# Patient Record
Sex: FEMALE | Race: WHITE | NOT HISPANIC OR LATINO | Employment: OTHER | URBAN - METROPOLITAN AREA
[De-identification: names, ages, dates, MRNs, and addresses within clinical notes are randomized per-mention and may not be internally consistent; named-entity substitution may affect disease eponyms.]

---

## 2017-01-16 ENCOUNTER — APPOINTMENT (OUTPATIENT)
Dept: LAB | Facility: CLINIC | Age: 57
End: 2017-01-16
Payer: COMMERCIAL

## 2017-01-16 ENCOUNTER — ALLSCRIPTS OFFICE VISIT (OUTPATIENT)
Dept: OTHER | Facility: OTHER | Age: 57
End: 2017-01-16

## 2017-01-16 ENCOUNTER — TRANSCRIBE ORDERS (OUTPATIENT)
Dept: LAB | Facility: CLINIC | Age: 57
End: 2017-01-16

## 2017-01-16 DIAGNOSIS — E78.49 OTHER HYPERLIPIDEMIA: ICD-10-CM

## 2017-01-16 DIAGNOSIS — E78.49 OTHER HYPERLIPIDEMIA: Primary | ICD-10-CM

## 2017-01-16 DIAGNOSIS — E78.5 HYPERLIPIDEMIA: ICD-10-CM

## 2017-01-16 LAB
ANION GAP SERPL CALCULATED.3IONS-SCNC: 5 MMOL/L (ref 4–13)
BUN SERPL-MCNC: 11 MG/DL (ref 5–25)
CALCIUM SERPL-MCNC: 9.1 MG/DL (ref 8.3–10.1)
CHLORIDE SERPL-SCNC: 102 MMOL/L (ref 100–108)
CHOLEST SERPL-MCNC: 190 MG/DL (ref 50–200)
CO2 SERPL-SCNC: 30 MMOL/L (ref 21–32)
CREAT SERPL-MCNC: 0.69 MG/DL (ref 0.6–1.3)
GFR SERPL CREATININE-BSD FRML MDRD: >60 ML/MIN/1.73SQ M
GLUCOSE SERPL-MCNC: 91 MG/DL (ref 65–140)
HDLC SERPL-MCNC: 66 MG/DL (ref 40–60)
LDLC SERPL CALC-MCNC: 112 MG/DL (ref 0–100)
POTASSIUM SERPL-SCNC: 4.1 MMOL/L (ref 3.5–5.3)
SODIUM SERPL-SCNC: 137 MMOL/L (ref 136–145)
TRIGL SERPL-MCNC: 61 MG/DL

## 2017-01-16 PROCEDURE — 80048 BASIC METABOLIC PNL TOTAL CA: CPT

## 2017-01-16 PROCEDURE — 80061 LIPID PANEL: CPT

## 2017-01-16 PROCEDURE — 36415 COLL VENOUS BLD VENIPUNCTURE: CPT

## 2017-02-09 ENCOUNTER — ALLSCRIPTS OFFICE VISIT (OUTPATIENT)
Dept: OTHER | Facility: OTHER | Age: 57
End: 2017-02-09

## 2017-06-19 ENCOUNTER — TRANSCRIBE ORDERS (OUTPATIENT)
Dept: ADMINISTRATIVE | Facility: HOSPITAL | Age: 57
End: 2017-06-19

## 2017-06-19 DIAGNOSIS — Z12.31 VISIT FOR SCREENING MAMMOGRAM: Primary | ICD-10-CM

## 2017-07-19 ENCOUNTER — HOSPITAL ENCOUNTER (OUTPATIENT)
Dept: MAMMOGRAPHY | Facility: HOSPITAL | Age: 57
Discharge: HOME | End: 2017-07-19
Payer: COMMERCIAL

## 2017-07-19 DIAGNOSIS — Z12.31 VISIT FOR SCREENING MAMMOGRAM: ICD-10-CM

## 2017-07-19 PROCEDURE — G0202 SCR MAMMO BI INCL CAD: HCPCS

## 2017-10-31 ENCOUNTER — ALLSCRIPTS OFFICE VISIT (OUTPATIENT)
Dept: OTHER | Facility: OTHER | Age: 57
End: 2017-10-31

## 2018-01-09 NOTE — CONSULTS
Chief Complaint  Chief Complaint Free Text Note Form: New pt  here for mwm consult  Pt  stated no complaints  stop bang 1/8      History of Present Illness  Free Text HPI: Obesity-  Severity: Mild  Onset: Past 20 years  Modifiers: Has tried Weight Watchers different diet modifications with success but unsustained  Worse with emotional eating  Associated Symptoms: Feels uncomfortable  Past Medical History    1  History Of 2  Previous Pregnancies (V61 5)   2  History of chest pain (V13 89) (Z87 898)   3  History of Joint pain, knee (719 46) (M25 569)   4  History of Skin rash (782 1) (R21)  Active Problems And Past Medical History Reviewed: The active problems and past medical history were reviewed and updated today  Assessment    1  Weight gain (783 1) (R63 5)   2  Anxiety (300 00) (F41 9)   3  Depression with anxiety (300 4) (F41 8)   4  Adult BMI > 30 (V85 30) (E66 8)    Plan   1  (1) COMPREHENSIVE METABOLIC PANEL; Status:Active; Requested for:21Qau2761;    2  (1) HEMOGLOBIN A1C; Status:Active; Requested for:38Rlp7379;    3  (1) INSULIN, FASTING; Status:Active; Requested for:31Pwm6506;    4  (1) LIPID PANEL, FASTING; Status:Active; Requested for:34Tpe8486;    5  (1) TSH WITH FT4 REFLEX; Status:Active; Requested for:08Mrr7947;    6  Referral to Weight Management Progam Physician Referral  Consult  Status: Active    Requested for: 45TMS0622  Care Summary provided  : Yes    Discussion/Summary  Discussion Summary:   59 yo female w/ anxiety, depression, glaucoma and obesity here to pursue medical weight management to improve weight and health  Obesity class 1:  -discussed options of conservative vs SIMONA program +/- meal replacement  -initial focus of 5-10% weight loss over 3-6 mos for improved health  -screening labs    Depression/anxiety: Stable  -On alprazolam and Zoloft as needed    History of glaucoma  -Cautious with phentermine    RTC for new start visit when labs return   Follow-up 12 weeks Goals: 1  Food log www  [email or web address]  2  No sugary beverages  At least 64oz of water daily  3  Increase physical activity by 10 minutes daily  4  4978-8293 calories, see sample menu  Review of Systems  Focused-Female:   Constitutional: no fever  ENT: no sore throat  Cardiovascular: no chest pain and no palpitations  Respiratory: no shortness of breath  Gastrointestinal: no abdominal pain  Genitourinary: no dysuria  Musculoskeletal: no arthralgias  Integumentary: no rashes  Neurological: no headache  Other Symptoms: Psych:+ Depression/anxiety  Active Problems    1  Adult BMI > 30 (V85 30) (E66 8)   2  Anxiety (300 00) (F41 9)   3  Depression with anxiety (300 4) (F41 8)   4  Glaucoma, narrow-angle (365 20,365 70) (H40 20X0)   5  Need for prophylactic vaccination and inoculation against influenza (V04 81) (Z23)   6  Pap smear for cervical cancer screening (V76 2) (Z12 4)   7  Screening for lipoid disorders (V77 91) (Z13 220)   8  Uterine prolapse (618 1) (N81 4)   9  Visit for screening mammogram (V76 12) (Z12 31)   10  Vitamin D deficiency (268 9) (E55 9)    Surgical History    1  History of Oral Surgery Tooth Extraction  Surgical History Reviewed: The surgical history was reviewed and updated today  Family History  Mother    1  Family history of Chronic Obstructive Pulmonary Disease   2  Family history of Tobacco Use  Father    3  Family history of Bladder Cancer (V16 52)   4  Family history of Diabetes Mellitus (V18 0)   5  Family history of Skin Cancer (V16 8)  Family History Reviewed: The family history was reviewed and updated today  Social History    · Alcohol Use (History)   · Never a smoker  Social History Reviewed: The social history was reviewed and updated today  Current Meds   1  ALPRAZolam 0 25 MG Oral Tablet; TAKE 1 TABLET AT BEDTIME; Therapy: 89COO2275 to (Evaluate:21Mgi1529)  Requested for: 52XPU7250; Last   Rx:79Ymu1997 Ordered   2  Sertraline HCl - 50 MG Oral Tablet; TAKE 1 5 TABLET Daily; Therapy: 48Rbk9051 to (Evaluate:14Hyh0195)  Requested for: 52EEG9047; Last   Rx:05Jan2016 Ordered   3  Vitamin D (Ergocalciferol) 71347 UNIT Oral Capsule; TAKE 1 CAPSULE WEEKLY; Therapy: 95GXK6600 to (Last Rx:17Aug2015)  Requested for: 17Aug2015 Ordered  Medication List Reviewed: The medication list was reviewed and updated today  Allergies    1  No Known Drug Allergies    2  No Known Environmental Allergies   3  No Known Food Allergies    Vitals  Vital Signs [Data Includes: Current Encounter]    Recorded: [id number] 10:57AM   Temperature 98 2 F    Heart Rate 74    Respiration 16    Systolic 354    Diastolic 76    Height 5 ft 4 8 in    Weight 201 lb 8 0 oz    BMI Calculated 33 74    BSA Calculated 1 99    O2 Saturation 98    Waist Circumference  45 5   Neck Circumference  14 5     Physical Exam    Constitutional   General appearance: No acute distress, well appearing and well nourished  well developed and obese  Eyes No conjunctival pallor  Ears, Nose, Mouth, and Throat Moist oral mucosa  Pulmonary   Respiratory effort: No increased work of breathing or signs of respiratory distress  Auscultation of lungs: Clear to auscultation  Cardiovascular   Auscultation of heart: Normal rate and rhythm, normal S1 and S2, without murmurs  Examination of extremities for edema and/or varicosities: Normal     Abdomen   Abdomen: Non-tender, no masses  The abdomen was obese  The abdomen was soft and nontender     Musculoskeletal   Gait and station: Normal     Psychiatric   Orientation to person, place, and time: Normal     Mood and affect: Normal          Results/Data  Encounter Results   STOP BANG Questionnaire [id number] 11:02AM User, Ahs     Test Name Result Flag Reference   STOP BANG Questionnaire Risk of GERMAN Low Risk     Snoring: No  Tired: No  Observed: No  Blood Pressure: No  BMI: No  Age: Yes  Neck Circumference: No  Gender: No   STOP BANG Questionnaire GERMAN Total Score 1     Snoring: No  Tired: No  Observed: No  Blood Pressure: No  BMI: No  Age: Yes  Neck Circumference: No  Gender: No       Future Appointments    Date/Time Provider Specialty Site   08/18/2016 08:00 AM Isela Shi DO [facility] [address] [facility]     Signatures   Electronically signed by : VALERIE Sullivan ; Jul 6 2016  1:04PM EST                       (Author)

## 2018-01-10 NOTE — MISCELLANEOUS
Message  Return to work or school:   Layton Reaves is under my professional care  She was seen in my office on 10/31/2017   She is able to return to work on  11/02/2017      PATIENT WAS SEEN IN OUR OFFICE 10/31/2017 EXCUSE FROM WORK FROM 10/30 TO 11/01 MAY RETURN 11/02/2017  DR Elisa Jesus / Molly Mckinley  Signatures   Electronically signed by :  Thalia Weir, ; Oct 31 2017  4:58PM EST                       (Author)

## 2018-01-12 VITALS
RESPIRATION RATE: 16 BRPM | DIASTOLIC BLOOD PRESSURE: 70 MMHG | BODY MASS INDEX: 32.36 KG/M2 | TEMPERATURE: 97.5 F | HEIGHT: 65 IN | WEIGHT: 194.25 LBS | SYSTOLIC BLOOD PRESSURE: 110 MMHG | HEART RATE: 72 BPM

## 2018-01-12 NOTE — PROGRESS NOTES
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 alcohol  Dietary Recall:   Breakfast: 1 cup cereal, 1/2 c skim milk  Snack: 100 calories or skip  Lunch: 300 calories  Snack: 100 calories  Dinner: 300 calories  Snack: 175   Beverages: water  Estimated fluid intake: >64 oz  Alcohol consumption: gave up  Food allergies/intolerances: n/a  Cooking: self  Shopping: self  She dines out seldom  Exercise Frequency:  She does not exercise  Her obesity/being overweight is related to excess energy intake and as evidenced by BMI=32 7  Nutrition Prescription: Estimated daily protein needs: 68-85 gm (1 2-1 5 gm/kg IBW) and Estimated daily fluid needs: 66 oz (35 cc/kg IBW)  Nutrition Intervention: Counseling provided with emphasis on meal planning, portion sizes, healthy snack choices, hydration, protein intake, exercise and food journaling  Education materials provided: [employer] manual    Comprehension: good  Expected Compliance: good     Goals: follow meal plan prescribed, plan meals and snacks daily, food journal, do not skip meals or snacks and 1200 calories  Monitor/Evaluation: weekly weight, food journal, fluid intake and exercise level  Active Problems    1  Adult BMI > 30 (V85 30) (E66 8)   2  Anxiety (300 00) (F41 9)   3  Depression with anxiety (300 4) (F41 8)   4  Glaucoma, narrow-angle (365 20,365 70) (H40 20X0)   5  Need for prophylactic vaccination and inoculation against influenza (V04 81) (Z23)   6  Pap smear for cervical cancer screening (V76 2) (Z12 4)   7  Screening for lipoid disorders (V77 91) (Z13 220)   8  Uterine prolapse (618 1) (N81 4)   9  Visit for screening mammogram (V76 12) (Z12 31)   10  Vitamin D deficiency (268 9) (E55 9)   11  Weight gain (783 1) (R63 5)    Past Medical History    1  History Of 2  Previous Pregnancies (V61 5)   2  History of chest pain (V13 89) (Z87 898)   3  History of Joint pain, knee (719 46) (M25 569)   4  History of Skin rash (782 1) (R21)    Surgical History    1  History of Oral Surgery Tooth Extraction    Family History  Mother    1  Family history of Chronic Obstructive Pulmonary Disease   2  Family history of Tobacco Use  Father    3  Family history of Bladder Cancer (V16 52)   4  Family history of Diabetes Mellitus (V18 0)   5  Family history of Skin Cancer (V16 8)    Social History    · Alcohol Use (History)   · Never a smoker    Current Meds   1  ALPRAZolam 0 25 MG Oral Tablet; TAKE 1 TABLET AT BEDTIME; Therapy: 86TXT7521 to (Evaluate:96Fwq4151)  Requested for: 43IVY7172; Last   Rx:50Wbc5114 Ordered   2  Sertraline HCl - 50 MG Oral Tablet; TAKE 1 5 TABLET Daily; Therapy: 78Woy0123 to (Evaluate:65Jaw3633)  Requested for: 02JSA4302; Last   Rx:05Jan2016 Ordered   3  Vitamin D (Ergocalciferol) 07046 UNIT Oral Capsule; TAKE 1 CAPSULE WEEKLY; Therapy: 59QER9034 to (Last Rx:17Aug2015)  Requested for: 17Aug2015 Ordered    Allergies    1  No Known Drug Allergies    2  No Known Environmental Allergies   3  No Known Food Allergies    Vitals  Signs   Recorded: 01Fck3598 03:58PM   Height: 5 ft 5 in  Weight: 196 lb 1 6 oz  BMI Calculated: 32 63  BSA Calculated: 1 96    Future Appointments    Date/Time Provider Specialty Site   10/28/2016 03:30 PM VALERIE Fong  Bariatric Medicine [facility]     Signatures   Electronically signed by :  Rosalba Zamora, ; Aug 22 2016  4:28PM EST                       (Author)    Electronically signed by : VALERIE Payne ; Aug 23 2016  9:24AM EST                       (Co-author)

## 2018-01-12 NOTE — PROGRESS NOTES
Chief Complaint  Chief Complaint Free Text Note Form: Patient here for MW follow up, will be back for 5pm class  History of Present Illness  HPI: Doing excellent with lifestyle changes  Very appreciative of the program  Voices no complaints      Past Medical History    1  History Of 2  Previous Pregnancies (V61 5)   2  History of chest pain (V13 89) (Z87 898)   3  History of Joint pain, knee (719 46) (M25 569)   4  History of Skin rash (782 1) (R21)    Assessment    1  Weight gain (783 1) (R63 5)   2  Hyperlipidemia (272 4) (E78 5)   3  Depression with anxiety (300 4) (F41 8)   4  Adult BMI > 30 (V85 30) (E66 8)    Plan  Hyperlipidemia    1  (1) BASIC METABOLIC PROFILE; Status:Active; Requested for:09Nov2016;    2  (1) LIPID PANEL, FASTING; Status:Active; Requested for:09Nov2016;     Discussion/Summary  Discussion Summary:   61 yo female w/ anxiety, depression, glaucoma and obesity here to pursue medical weight management to improve weight and health  Obesity class 1:  -discussed options of conservative vs SIMONA program +/- meal replacement  -initial focus of 5-10% weight loss over 3-6 mos for improved health(met)    Depression/anxiety: Stable  -On alprazolam and Zoloft as needed    History of glaucoma:  -Cautious with phentermine    Hyperlipidemia:  -Recheck lipids    RTC in 3 months with me    Add 2 days per week of resistance band training  Active Problems    1  Adult BMI > 30 (V85 30) (E66 8)   2  Anxiety (300 00) (F41 9)   3  Depression with anxiety (300 4) (F41 8)   4  Glaucoma, narrow-angle (365 20,365 70) (H40 20X0)   5  Need for prophylactic vaccination and inoculation against influenza (V04 81) (Z23)   6  Pap smear for cervical cancer screening (V76 2) (Z12 4)   7  Screening for lipoid disorders (V77 91) (Z13 220)   8  Uterine prolapse (618 1) (N81 4)   9  Visit for screening mammogram (V76 12) (Z12 31)   10  Vitamin D deficiency (268 9) (E55 9)   11  Weight gain (783 1) (R63 5)   12   Well adult on routine health check (V70 0) (Z00 00)    Surgical History    1  History of Oral Surgery Tooth Extraction    Family History  Mother    1  Family history of Chronic Obstructive Pulmonary Disease   2  Family history of Tobacco Use  Father    3  Family history of Bladder Cancer (V16 52)   4  Family history of Diabetes Mellitus (V18 0)   5  Family history of Skin Cancer (V16 8)    Social History    · Alcohol Use (History)   · Never a smoker    Current Meds   1  ALPRAZolam 0 25 MG Oral Tablet; TAKE 1 TABLET AT BEDTIME; Therapy: 87JWA8837 to (Evaluate:03Xvh7429)  Requested for: 23LNL8416; Last   Rx:77Uxz6951 Ordered   2  Sertraline HCl - 50 MG Oral Tablet; TAKE 1 5 TABLET Daily; Therapy: 74Puz7075 to (Evaluate:54Fiy1584)  Requested for: 01KHW2976; Last   Rx:13Mvj9320 Ordered    Allergies    1  No Known Drug Allergies    2  No Known Environmental Allergies   3  No Known Food Allergies    Vitals  Vital Signs    Recorded: 97JIP6773 19:95GM   Systolic 569   Diastolic 70   Heart Rate 76   Respiration 16   Temperature 97 8 F   Height 5 ft 5 in   Weight 185 lb 3 2 oz   BMI Calculated 30 82   BSA Calculated 1 92     Physical Exam    Constitutional   General appearance: No acute distress, well appearing and well nourished  well developed and obese  Eyes No conjunctival pallor  Ears, Nose, Mouth, and Throat Moist oral mucosa  Pulmonary   Respiratory effort: No increased work of breathing or signs of respiratory distress  Auscultation of lungs: Clear to auscultation  Cardiovascular   Auscultation of heart: Normal rate and rhythm, normal S1 and S2, without murmurs  Examination of extremities for edema and/or varicosities: Normal     Abdomen   Abdomen: Non-tender, no masses  The abdomen was obese  The abdomen was soft and nontender     Musculoskeletal   Gait and station: Normal     Psychiatric   Orientation to person, place, and time: Normal     Mood and affect: Normal          Future Appointments    Date/Time Provider Specialty Site   10/16/2017 03:30 PM Rajni Arredondo DO Family Medicine [address] [facility]     Signatures   Electronically signed by : VALERIE Amador ; Nov 9 2016  8:56AM EST                       (Author)

## 2018-01-12 NOTE — PROGRESS NOTES
History of Present Illness  HPI: Mino Schneider is here for f/u  Current wt 188  3 lbs, loss of 15 1 lbs x 6 wks  Tracking consistently and consuming 1200 calories  Indulges more on the w/e but thrilled that she continues to lose  Has not incorporated exercise and we discussed that when she is ready this will help her metabolic rate especially if weight loss plateaus  Bariatric MNT MWM [facility]:   Weight Assessment: IBW: 125, ABW: 140 8, Weight Change: 15 1 lbs x 6 weeks, Highest Weight: 203 4, Lowest Weight: 126, Goal Weight: ,   Weight loss attempts: Weight Watchers: 25 lbs and Other: portion control 20 lbs, LA Wt Loss 45 lbs  Excess calories come from in between meal snacking, large portions at mealtimes and high calorie high fat food choices  Dietary Recall:   Breakfast: multigrain english muffin w/1 TBSP pb, 1 TBSP strawberry jam, vitamins  Snack: skip  Lunch: balanced breaks OR 3 low fat string cheese with cashews  Snack: skip or william bar  Dinner: 4 oz lean pro, 1 1/2 servings veggies, sometimes carbs  Beverages: water  Estimated fluid intake: 64 oz  Alcohol consumption: gin & diet tonic on w/e  Food allergies/intolerances: n/a  Cooking: self  Shopping: self  She dines out occasionally  Exercise Frequency:  She does not exercise  Her obesity/being overweight is related to excess energy intake and as evidenced by BMI=31 4  Nutrition Prescription: Estimated calories for weight loss: eCalc BMR 1435, wt loss w/o exercise: 722-1222, Estimated daily protein needs: 68-85 gm (1 2-1 5 gm/kg IBW) and Estimated daily fluid needs: 66 oz (35 cc/kg (IBW)  Nutrition Intervention: Counseling provided with emphasis on meal planning, portion sizes, healthy snack choices, hydration, fiber intake, exercise and food journaling  Education materials provided: [employer] manual    Comprehension: good  Expected Compliance: good     Goals: follow meal plan prescribed, plan meals and snacks daily, food journal, do not skip meals or snacks and 1200 calories  Monitor/Evaluation: weekly weight, food journal, fluid intake and exercise level  Active Problems    1  Adult BMI > 30 (V85 30) (E66 8)   2  Anxiety (300 00) (F41 9)   3  Depression with anxiety (300 4) (F41 8)   4  Glaucoma, narrow-angle (365 20,365 70) (H40 20X0)   5  Need for prophylactic vaccination and inoculation against influenza (V04 81) (Z23)   6  Pap smear for cervical cancer screening (V76 2) (Z12 4)   7  Screening for lipoid disorders (V77 91) (Z13 220)   8  Uterine prolapse (618 1) (N81 4)   9  Visit for screening mammogram (V76 12) (Z12 31)   10  Vitamin D deficiency (268 9) (E55 9)   11  Weight gain (783 1) (R63 5)    Past Medical History    1  History Of 2  Previous Pregnancies (V61 5)   2  History of chest pain (V13 89) (Z87 898)   3  History of Joint pain, knee (719 46) (M25 569)   4  History of Skin rash (782 1) (R21)    Surgical History    1  History of Oral Surgery Tooth Extraction    Family History  Mother    1  Family history of Chronic Obstructive Pulmonary Disease   2  Family history of Tobacco Use  Father    3  Family history of Bladder Cancer (V16 52)   4  Family history of Diabetes Mellitus (V18 0)   5  Family history of Skin Cancer (V16 8)    Social History    · Alcohol Use (History)   · Never a smoker    Current Meds   1  ALPRAZolam 0 25 MG Oral Tablet; TAKE 1 TABLET AT BEDTIME; Therapy: 25XYS2242 to (Evaluate:82Pps1211)  Requested for: 32XDG7612; Last   Rx:98Ack1202 Ordered   2  Sertraline HCl - 50 MG Oral Tablet; TAKE 1 5 TABLET Daily; Therapy: 54Hco1723 to (Evaluate:60Sym9702)  Requested for: 45GWJ5043; Last   Rx:05Jan2016 Ordered   3  Vitamin D (Ergocalciferol) 56861 UNIT Oral Capsule; TAKE 1 CAPSULE WEEKLY; Therapy: 91KGT5251 to (Last Rx:89Fsq5202)  Requested for: 35Afa5105 Ordered    Allergies    1  No Known Drug Allergies    2  No Known Environmental Allergies   3   No Known Food Allergies    Vitals  Signs   Recorded: 92GJJ2494 04:54PM   Height: 5 ft 5 in  Weight: 188 lb 4 8 oz  BMI Calculated: 31 34  BSA Calculated: 1 93    Future Appointments    Date/Time Provider Specialty Site   11/09/2016 09:00 AM Serge Wren  [address] [facility]   11/09/2016 08:30 AM VALERIE Valencia  Bariatric Medicine [facility]   10/12/2016 03:30 PM Rajwinder Geller DO [facility] [address] [facility]     Signatures   Electronically signed by :  Anita Ramey, ; Sep 26 2016  5:01PM EST                       (Author)    Electronically signed by : VALERIE Mckeon ; Sep 27 2016  8:15AM EST                       (Co-author)

## 2018-01-12 NOTE — PROGRESS NOTES
History of Present Illness  HPI: Patrick Molina here for initial RD visit for [employer]  Current wt: 203 4 lbs  Over last year wt stable, gradual gains prior to this (11/16/2012 186 lbs)  Does better during the school year when she has more structure but states this summer has been doing "whatever" and having a lot of emotional troubles as well as not exercising because she isn't worth it  She states that she needs someone to be held accountable too  Tells me if I tell her to exercise she will  Goal set to go for 2 30min walks and do 30 min on stationary bike over next week  Would prefer to focus on real food vs  products at this time  Does not currently food log but does measure some of her items and has a food background (home )   [facility]ric [facility]:   Weight Assessment: IBW: 125, ABW: 144 6, Highest Weight: 203 4, Lowest Weight: 126 (prior to marriage), Goal Weight: STG <200 lbs,  lbs  Weight loss attempts: Weight Watchers: 25 lbs lbs and Other: portion control lost 20 lbs, LA Wt Loss 45 lbs  Excess calories come from in between meal snacking, large portions at mealtimes, high calorie high fat food choices and ? alcohol  Dietary Recall:   Breakfast: coffee w/splenda, splash of whole milk  1 cup cereal (blend of 4), 1/2 skim milk  Snack: skip  Lunch: Balance Breaks & Yo Plait whipped  Snack: william bar  Dinner: meat/starch (quinoa or rice), veg  Snack: Lot of snacking and alcohol   Beverages: water, coffee, wine, beer  Estimated fluid intake: 64 oz water  Alcohol consumption: beer or wine 4x/night almost daily  During school year only 1x/wk  Food allergies/intolerances: n/a  Cooking: self  Shopping: self  She dines out seldom  Exercise Frequency:  She does not exercise  Her obesity/being overweight is related to excess energy intake and as evidenced by BMI=33 8     Nutrition Prescription: Estimated calories for weight loss: shirley BMR 1569x1 3-3362=9779, eCalc BMR 1503, wt loss w/o exercise 804-1304, w/exercise 8557-0034, Estimated daily protein needs: 68-85 gm (1 2-1 5 gm/kg IBW) and Estimated daily fluid needs: 66 oz (35 cc/kg IBW)  Breakfast: 1 cup cereal, 1/2 cup skim milk (245)  Snack: bar (retail) <200  Lunch: Balance Breaks (180), Yo Plait Whipped (100)  Snack: 100 snack  Dinner: 300 calories: lean pro/starch/veg  Snack: 175 snack   Nutrition Intervention: Counseling provided with emphasis on meal planning, portion sizes, healthy snack choices, hydration, fiber intake, protein intake, exercise and food journaling  Education materials provided: [employer] manual    Comprehension: good  Expected Compliance: good  Goals: follow meal plan prescribed, plan meals and snacks daily, food journal, do not skip meals or snacks and 1200 calories  Monitor/Evaluation: weekly weight, food journal, fluid intake and exercise level  Active Problems    1  Adult BMI > 30 (V85 30) (E66 8)   2  Anxiety (300 00) (F41 9)   3  Depression with anxiety (300 4) (F41 8)   4  Glaucoma, narrow-angle (365 20,365 70) (H40 20X0)   5  Need for prophylactic vaccination and inoculation against influenza (V04 81) (Z23)   6  Pap smear for cervical cancer screening (V76 2) (Z12 4)   7  Screening for lipoid disorders (V77 91) (Z13 220)   8  Uterine prolapse (618 1) (N81 4)   9  Visit for screening mammogram (V76 12) (Z12 31)   10  Vitamin D deficiency (268 9) (E55 9)   11  Weight gain (783 1) (R63 5)    Past Medical History    1  History Of 2  Previous Pregnancies (V61 5)   2  History of chest pain (V13 89) (Z87 898)   3  History of Joint pain, knee (719 46) (M25 569)   4  History of Skin rash (782 1) (R21)    Surgical History    1  History of Oral Surgery Tooth Extraction    Family History  Mother    1  Family history of Chronic Obstructive Pulmonary Disease   2  Family history of Tobacco Use  Father    3  Family history of Bladder Cancer (V16 52)   4   Family history of Diabetes Mellitus (V18 0)   5  Family history of Skin Cancer (V16 8)    Social History    · Alcohol Use (History)   · Never a smoker    Current Meds   1  ALPRAZolam 0 25 MG Oral Tablet; TAKE 1 TABLET AT BEDTIME; Therapy: 98LDF7111 to (Evaluate:56Ttl2046)  Requested for: 54KLM7968; Last   Rx:97Pzx5351 Ordered   2  Sertraline HCl - 50 MG Oral Tablet; TAKE 1 5 TABLET Daily; Therapy: 71Fqz5307 to (Evaluate:78Wcj2867)  Requested for: 10TAB3691; Last   Rx:05Jan2016 Ordered   3  Vitamin D (Ergocalciferol) 86863 UNIT Oral Capsule; TAKE 1 CAPSULE WEEKLY; Therapy: 59KLO4697 to (Last Rx:17Aug2015)  Requested for: 17Aug2015 Ordered    Allergies    1  No Known Drug Allergies    2  No Known Environmental Allergies   3  No Known Food Allergies    Vitals  Signs   Recorded: 42Itf5936 03:04PM   Height: 5 ft 5 in  Weight: 203 lb 6 oz  BMI Calculated: 33 84  BSA Calculated: 1 99    Results/Data  Tanita Flowsheet 05WCV9920 03:22PM [facility]     Test Name Result Flag Reference   Height 65     Weight 203 4     Body Fat % 44 7     Fat Mass 91 0     FFM ( Fat Free Mass) 112 4     Muscle Mass 106 8     TBW ( Total Body Water) 76 2     TBW % 37 5     Bone Mass 5 6     BMR ( Basal Metabolic Rate) 4843     Metabolic Age 66     Visceral Fat Rating 12     BMI 33 8         Future Appointments    Date/Time Provider Specialty Site   08/22/2016 03:30 PM Susan Cormier [address] [place] [address] [facility]   10/28/2016 03:30 PM VALERIE Mack  Bariatric Medicine [facility]   08/18/2016 08:00 AM Annamaria Jones DO Family Medicine [address] [facility]     Signatures   Electronically signed by :  Zachary Nj, ; Aug  9 2016  3:19PM EST                       (Author)    Electronically signed by : VALERIE Noland ; Aug  9 2016  4:04PM EST                       (Co-author)

## 2018-01-13 NOTE — PROGRESS NOTES
History of Present Illness  HPI: Stephanie Chapin is here for f/u  Current wt 185 8 lbs, loss of 0 5 lb x 2 wks  Scale this a m  at home is 181 8 lbs and prior to appointment she did drink >1 L water and had breakfast after her labwork  Slight discouragement but states she is under a lot of stress with making all the costumes for school play so routines have been disrupted  She is improving her water intake and has decreased alcohol intake which were issues at last visit  Her evening schedule now has her at work until 10:00 p m  and is having difficulty fitting in a "dinner " Suggestions given for this  [facility]:   Weight Assessment: IBW: 125 lbs, ABW: 140 2 lbs, Weight Change: loss of 17 6 lbs x 4 months, Highest Weight: 203 4 lbs, Lowest Weight: 126 lbs, Goal Weight:  lbs,  lbs  Excess calories come from in between meal snacking, large portions at mealtimes and high calorie high fat food choices  Dietary Recall:   Breakfast: high protein oatmeal or powedered pb w/jelly on english muffin  Snack: varies  Lunch: vanilla greek yogurt w/granola  Snack: trail mix  Dinner: skip  Snack: trail mix   Beverages: water, coffee, gin/tonic, wine  Food allergies/intolerances: n/a  Cooking: self  Shopping: self  She dines out 1-2 times per week  Exercise Frequency:  She does not exercise  Her obesity/being overweight is related to excess energy intake and as evidenced by BMI=31 0  Nutrition Prescription: Estimated calories for weight loss: eCalc BMR 1424, wt loss w/o exercise 709-1209, Estimated daily protein needs: 68-85 gm (1 2-1 5 gm/kg IBW) and Estimated daily fluid needs: 66 oz (35 cc/kg IBW)  Dinner: s/w: s/w thin or 2 slices light bread, 3 oz lunch meat, 1 slice cheese, veggies)  Nutrition Intervention: Counseling provided with emphasis on meal planning, portion sizes, hydration, protein intake and food journaling     Education materials provided: [employer] manual    Comprehension: good  Expected Compliance: good  Goals: follow meal plan prescribed, reduce portion sizes at mealtimes, plan meals and snacks daily, food journal, do not skip meals or snacks and 1200 calories  Monitor/Evaluation: weekly weight, food journal, fluid intake and exercise level  Active Problems    1  Adult BMI > 30 (V85 30) (E66 8)   2  Anxiety (300 00) (F41 9)   3  Depression with anxiety (300 4) (F41 8)   4  Glaucoma, narrow-angle (365 20,365 70) (H40 20X0)   5  Hyperlipidemia (272 4) (E78 5)   6  Need for prophylactic vaccination and inoculation against influenza (V04 81) (Z23)   7  Pap smear for cervical cancer screening (V76 2) (Z12 4)   8  Screening for lipoid disorders (V77 91) (Z13 220)   9  Uterine prolapse (618 1) (N81 4)   10  Visit for screening mammogram (V76 12) (Z12 31)   11  Vitamin D deficiency (268 9) (E55 9)   12  Weight gain (783 1) (R63 5)   13  Well adult on routine health check (V70 0) (Z00 00)    Past Medical History    1  History Of 2  Previous Pregnancies (V61 5)   2  History of chest pain (V13 89) (Z87 898)   3  History of Joint pain, knee (719 46) (M25 569)   4  History of Skin rash (782 1) (R21)    Surgical History    1  History of Oral Surgery Tooth Extraction    Family History  Mother    1  Family history of Chronic Obstructive Pulmonary Disease   2  Family history of Tobacco Use  Father    3  Family history of Bladder Cancer (V16 52)   4  Family history of Diabetes Mellitus (V18 0)   5  Family history of Skin Cancer (V16 8)    Social History    · Alcohol Use (History)   · Never a smoker    Current Meds   1  ALPRAZolam 0 25 MG Oral Tablet; TAKE 1 TABLET AT BEDTIME; Therapy: 87TEK9307 to (Evaluate:43Pwt0369)  Requested for: 06OJG1715; Last   Rx:15Cfv5107 Ordered   2  Sertraline HCl - 50 MG Oral Tablet; TAKE 1 5 TABLET Daily; Therapy: 31Pvh9473 to (Evaluate:90Bbl7974)  Requested for: 67BZU8284; Last   Rx:05Jan2016 Ordered    Allergies    1   No Known Drug Allergies    2  No Known Environmental Allergies   3  No Known Food Allergies    Vitals  Signs   Recorded: 76VRQ8848 08:52AM   Height: 5 ft 5 in  Weight: 185 lb 12 8 oz  BMI Calculated: 30 92  BSA Calculated: 1 92    Future Appointments    Date/Time Provider Specialty Site   02/09/2017 03:30 PM VALERIE Reynoso  Bariatric Medicine [facility]   10/16/2017 03:30 PM Bernadette Marina DO [facility] [address] [facility]     Signatures   Electronically signed by :  Nathaniel Waddell, ; Jan 17 2017  9:08AM EST                       (Author)    Electronically signed by : VALERIE Ma ; Jan 17 2017  9:33AM EST                       (Co-author)

## 2018-01-13 NOTE — PROGRESS NOTES
Assessment    1  Well adult on routine health check (V70 0) (Z00 00)   2  Need for prophylactic vaccination and inoculation against influenza (V04 81) (Z23)    Plan  Depression with anxiety    · Sertraline HCl - 50 MG Oral Tablet; TAKE 1 5 TABLET Daily  Need for prophylactic vaccination and inoculation against influenza    · Fluzone Quadrivalent 0 5 ML Intramuscular Suspension Prefilled Syringe    Discussion/Summary  health maintenance visit Currently, she eats a healthy diet and has an adequate exercise regimen  cervical cancer screening is current Breast cancer screening: mammogram is current  Colorectal cancer screening: colorectal cancer screening is current  The immunizations are needed  Advice and education were given regarding aerobic exercise and vitamin D supplements  Patient discussion: discussed with the patient  Chief Complaint  Patient here for Annual wellness exam      History of Present Illness  HM, Adult Female: The patient is being seen for a health maintenance evaluation  General Health: The patient's health since the last visit is described as good  She has regular dental visits  She denies vision problems  She denies hearing loss  Immunizations status: not up to date  Lifestyle:  She consumes a diverse and healthy diet  She does not have any weight concerns  She exercises regularly  She does not use tobacco  She denies alcohol use  She denies drug use  Reproductive health: the patient is postmenopausal    Screening: cancer screening reviewed and updated  Cervical cancer screening includes a pap smear performed last year  Breast cancer screening includes a mammogram performed last year  Colorectal cancer screening includes a colonoscopy performed within the past ten years  metabolic screening reviewed and updated  Metabolic screening includes lipid profile performed within the past five years, glucose screening performed last year and thyroid function test performed last year  HPI: FELL AT [facility] 64  IN AUGUST HAD DENTAL PAIN- SINUS INFECTION, TEETH PAIN, WENT TO ENT  INFECTED LYMPH NODE,      Review of Systems    Constitutional: No fever, no chills, feels well, no tiredness, no recent weight gain or weight loss  Eyes: No complaints of eye pain, no red eyes, no eyesight problems, no discharge, no dry eyes, no itching of eyes  ENT: no complaints of earache, no loss of hearing, no nose bleeds, no nasal discharge, no sore throat, no hoarseness  Cardiovascular: No complaints of slow heart rate, no fast heart rate, no chest pain, no palpitations, no leg claudication, no lower extremity edema  Respiratory: No complaints of shortness of breath, no wheezing, no cough, no SOB on exertion, no orthopnea, no PND  Gastrointestinal: No complaints of abdominal pain, no constipation, no nausea or vomiting, no diarrhea, no bloody stools  Genitourinary: No complaints of dysuria, no incontinence, no pelvic pain, no dysmenorrhea, no vaginal discharge or bleeding  Musculoskeletal: THUMB PAIN, but as noted in HPI  Integumentary: No complaints of skin rash or lesions, no itching, no skin wounds, no breast pain or lump  Neurological: No complaints of headache, no confusion, no convulsions, no numbness, no dizziness or fainting, no tingling, no limb weakness, no difficulty walking  Psychiatric: Not suicidal, no sleep disturbance, no anxiety or depression, no change in personality, no emotional problems  Endocrine: No complaints of proptosis, no hot flashes, no muscle weakness, no deepening of the voice, no feelings of weakness  Hematologic/Lymphatic: No complaints of swollen glands, no swollen glands in the neck, does not bleed easily, does not bruise easily  Active Problems    1  Adult BMI > 30 (V85 30) (E66 8)   2  Anxiety (300 00) (F41 9)   3  Depression with anxiety (300 4) (F41 8)   4  Glaucoma, narrow-angle (365 20,365 70) (H40 20X0)   5   Need for prophylactic vaccination and inoculation against influenza (V04 81) (Z23)   6  Pap smear for cervical cancer screening (V76 2) (Z12 4)   7  Screening for lipoid disorders (V77 91) (Z13 220)   8  Uterine prolapse (618 1) (N81 4)   9  Visit for screening mammogram (V76 12) (Z12 31)   10  Vitamin D deficiency (268 9) (E55 9)   11  Weight gain (783 1) (R63 5)    Past Medical History    · History Of 2  Previous Pregnancies (V61 5)   · History of chest pain (V13 89) (Z87 898)   · History of Joint pain, knee (719 46) (M25 569)   · History of Skin rash (782 1) (R21)    Surgical History    · History of Oral Surgery Tooth Extraction    Family History  Mother    · Family history of Chronic Obstructive Pulmonary Disease   · Family history of Tobacco Use  Father    · Family history of Bladder Cancer (V16 52)   · Family history of Diabetes Mellitus (V18 0)   · Family history of Skin Cancer (V16 8)    Social History    · Alcohol Use (History)   · Socially   · Never a smoker    Current Meds   1  ALPRAZolam 0 25 MG Oral Tablet; TAKE 1 TABLET AT BEDTIME; Therapy: 76TTV5844 to (Evaluate:02Qrx9464)  Requested for: 80YLE1365; Last   Rx:83Tyj5309 Ordered   2  Sertraline HCl - 50 MG Oral Tablet; TAKE 1 5 TABLET Daily; Therapy: 23Fyz7775 to (Evaluate:01Mpv7337)  Requested for: 22TYQ2169; Last   Rx:03Ejw2647 Ordered    Allergies    1  No Known Drug Allergies    2  No Known Environmental Allergies   3  No Known Food Allergies    Vitals   Recorded: 65FFL4072 84:56UC   Systolic 211   Diastolic 64   Heart Rate 68   Respiration 18   Height 5 ft 4 49 in   Weight 188 lb    BMI Calculated 31 78   BSA Calculated 1 92     Physical Exam    Constitutional   General appearance: No acute distress, well appearing and well nourished  Head and Face   Head and face: Normal     Eyes   Conjunctiva and lids: No swelling, erythema or discharge  Pupils and irises: Equal, round, reactive to light      Ears, Nose, Mouth, and Throat   External inspection of ears and nose: Normal     Otoscopic examination: Tympanic membranes translucent with normal light reflex  Canals patent without erythema  Hearing: Normal     Nasal mucosa, septum, and turbinates: Normal without edema or erythema  Lips, teeth, and gums: Normal, good dentition  Oropharynx: Normal with no erythema, edema, exudate or lesions  Neck   Neck: Supple, symmetric, trachea midline, no masses  Thyroid: Normal, no thyromegaly  Pulmonary   Respiratory effort: No increased work of breathing or signs of respiratory distress  Auscultation of lungs: Clear to auscultation  Cardiovascular   Auscultation of heart: Normal rate and rhythm, normal S1 and S2, no murmurs  Examination of extremities for edema and/or varicosities: Normal     Abdomen   Abdomen: Non-tender, no masses  Liver and spleen: No hepatomegaly or splenomegaly  Lymphatic   Palpation of lymph nodes in neck: No lymphadenopathy  Palpation of lymph nodes in axillae: No lymphadenopathy  Musculoskeletal   Gait and station: Normal     Digits and nails: Normal without clubbing or cyanosis  Joints, bones, and muscles: Normal     Range of motion: Normal     Stability: Normal     Muscle strength/tone: Normal     Skin   Skin and subcutaneous tissue: Normal without rashes or lesions  Palpation of skin and subcutaneous tissue: Normal turgor  Neurologic   Cranial nerves: Cranial nerves II-XII intact  Cortical function: Normal mental status  Reflexes: 2+ and symmetric  Sensation: No sensory loss  Coordination: Normal finger to nose and heel to shin  Psychiatric   Judgment and insight: Normal     Orientation to person, place, and time: Normal     Recent and remote memory: Intact  Mood and affect: Normal        Future Appointments    Date/Time Provider Specialty Site   11/09/2016 09:00 AM Chuck Bauer  [facility]   11/09/2016 08:30 AM VALERIE Pearson   Bariatric Medicine [facility]   10/16/2017 03:30 PM Cherise Soares DO Family Medicine [address] [facility]     Signatures   Electronically signed by : Karie Herzog DO; Oct 12 2016  4:53PM EST                       (Author)

## 2018-01-14 VITALS — BODY MASS INDEX: 30.96 KG/M2 | WEIGHT: 185.8 LBS | HEIGHT: 65 IN

## 2018-01-14 NOTE — PROGRESS NOTES
History of Present Illness  HPI: Melina Veronica is here for f/u  Current wt 185 2 lbs, stable x 30 days with overall loss of 18 2 lbs x 4 months  She has not been tracking as she was but does track often on paper including reasons why she is eating  Holidays are stressful for her and her children have not been home much, doesn't do as well when she is lonely  She has been drinking alcohol more than she had been but is using diet tonic and found a lower calorie gin  Praised her maintenance over the holiday and explained that it is ok not to be in weight loss mode all the time  Maintenance through the holidays is a great goal  She has increased her water intake which was a goal of hers  [facility]ric [facility]:   Weight Assessment: IBW: 125 lbs, ABW: 140 1 lbs, Weight Change: 18 2 lbs x 4m 1 wk, Highest Weight: 203 4 lbs, Lowest Weight: 126 lbs, Goal Weight:  lbs,  lbs  Weight loss attempts: Weight Watchers: 25 lbs and Other: portion control 20 lbs, LA Wt Loss 45 lbs  Excess calories come from in between meal snacking, large portions at mealtimes and high calorie high fat food choices  Dietary Recall:   Breakfast: high protein oatmeal or pb on english muffin  Snack: [place] high protein bar  Lunch: balanced breaks or yogurt w/1/4 cup yogurt  Snack: popcorn  trail mix  Dinner: lazaro Martin  Snack: usually skip or trail mix   Beverages: water, coffee  Estimated fluid intake: 64 oz  Food allergies/intolerances: n/a  Cooking: self  Shopping: self  She dines out occasionally  Exercise Frequency:  She does not exercise  Her obesity/being overweight is related to excess energy intake and as evidenced by BMI=30 9     Nutrition Prescription: Estimated calories for weight loss: REEvue 1685, wt loss w/o exercise 1190, Tanita 1469x1 3-3256=612-3181, Estimated daily protein needs: 68-85 gm (1 2-1 5 gm/kg IBW) and Estimated daily fluid needs: 66 oz (35 cc/kg IBW)        Nutrition Intervention: Counseling provided with emphasis on meal planning, portion sizes, healthy snack choices, hydration, fiber intake, protein intake and food journaling  Education materials provided: [employer]irection manual    Comprehension: good  Expected Compliance: good  Goals: follow meal plan prescribed, reduce portion sizes at mealtimes, plan meals and snacks daily, Choose lower-calorie, lower-fat meal options at home and when dining out, food journal, do not skip meals or snacks and 1200 calories  Monitor/Evaluation: weekly weight, food journal, fluid intake and exercise level  Active Problems    1  Adult BMI > 30 (V85 30) (E66 8)   2  Anxiety (300 00) (F41 9)   3  Depression with anxiety (300 4) (F41 8)   4  Glaucoma, narrow-angle (365 20,365 70) (H40 20X0)   5  Hyperlipidemia (272 4) (E78 5)   6  Need for prophylactic vaccination and inoculation against influenza (V04 81) (Z23)   7  Pap smear for cervical cancer screening (V76 2) (Z12 4)   8  Screening for lipoid disorders (V77 91) (Z13 220)   9  Uterine prolapse (618 1) (N81 4)   10  Visit for screening mammogram (V76 12) (Z12 31)   11  Vitamin D deficiency (268 9) (E55 9)   12  Weight gain (783 1) (R63 5)   13  Well adult on routine health check (V70 0) (Z00 00)    Past Medical History    1  History Of 2  Previous Pregnancies (V61 5)   2  History of chest pain (V13 89) (Z87 898)   3  History of Joint pain, knee (719 46) (M25 569)   4  History of Skin rash (782 1) (R21)    Surgical History    1  History of Oral Surgery Tooth Extraction    Family History  Mother    1  Family history of Chronic Obstructive Pulmonary Disease   2  Family history of Tobacco Use  Father    3  Family history of Bladder Cancer (V16 52)   4  Family history of Diabetes Mellitus (V18 0)   5  Family history of Skin Cancer (V16 8)    Social History    · Alcohol Use (History)   · Never a smoker    Current Meds   1   ALPRAZolam 0 25 MG Oral Tablet; TAKE 1 TABLET AT BEDTIME; Therapy: 23DEY7287 to (Evaluate:32Jpo1364)  Requested for: 41MUJ4622; Last   Rx:44Xjr3325 Ordered   2  Sertraline HCl - 50 MG Oral Tablet; TAKE 1 5 TABLET Daily; Therapy: 57Bux0120 to (Evaluate:13Wnc9473)  Requested for: 60ZDW6038; Last   Rx:95Yqy8622 Ordered    Allergies    1  No Known Drug Allergies    2  No Known Environmental Allergies   3  No Known Food Allergies    Vitals  Signs   Recorded: 21Ltd7997 03:43PM   Height: 5 ft 5 in  Weight: 185 lb 3 2 oz  BMI Calculated: 30 82  BSA Calculated: 1 91    Future Appointments    Date/Time Provider Specialty Site   12/29/2016 09:00 AM Crystal Long  [facility]   01/12/2017 03:30 PM Susan Cormier [facility]   02/09/2017 03:30 PM VALERIE Calle  Bariatric Medicine [facility]   10/16/2017 03:30 PM Presley Figueredo DO Family Medicine [address] [facility]     Signatures   Electronically signed by :  Francheska Bojorquez, ; Dec 15 2016  3:50PM EST                       (Author)    Electronically signed by : VALERIE Romano ; Dec 16 2016  3:55PM EST                       (Co-author)

## 2018-01-16 NOTE — PROGRESS NOTES
History of Present Illness  HPI: Tremaine Koroma is here for f/u  Current wt 186 3 lbs, gain of 0 9 lb x 1 month and overall loss of 17 1 lbs x 5 months  She is having a tough time with the holidays, spending a lot of time alone and struggling with depression  When asked what she can do for herself that may help her mood she states drinking alcohol would be her crutch  However she is watching the amount and is looking forward to going back to her routine once school resumes  She finds it difficult to be at home so much with people stopping by and having food so accessible  She hasn't been tracking over the last week or so but is trying to be aware of her choices  Water intake has also declined with an increase of coffee and she is working on this  Her goal for next visit in ~2 wks is a 3 3 lb weight loss (183 lbs)  Bariatric [facility]:   Weight Assessment: IBW: 125 lbs, ABW: 140 3 lbs, Weight Change: 17 1 lb loss x 5 m, Highest Weight: 203 4 lbs, Lowest Weight: 126 lbs, Goal Weight:  lbs,  lbs  Excess calories come from in between meal snacking, large portions at mealtimes, high calorie high fat food choices and alcohol  Dietary Recall:   Breakfast: high protein oatmeal or pb on english muffin  Snack: varies  Lunch: dennis savi delightful bread (2 slices =69 federica), meatloaf  Snack: trail mix  Dinner: Yonathan Primes, veggies  Snack: trail mix   Beverages: water, coffee, gin&tonic  Alcohol consumption: >prior  Food allergies/intolerances: n/a  Cooking: self  Shopping: self  She dines out 1-2 times per week  Exercise Frequency:  She does not exercise  Her obesity/being overweight is related to excess energy intake and as evidenced by BMI=31 0  Nutrition Prescription: Estimated calories for weight loss: eCalc BMR 1426, wt loss w/o exercise 711-1211, Estimated daily protein needs: 68-85 gm (1 2-1 5 gm/kg IBW) and Estimated daily fluid needs: 66 oz (35 cc/kg IBW)  Nutrition Intervention: Counseling provided with emphasis on hydration, food journaling and stress management  Education materials provided: [employer] Direction manual    Comprehension: good  Expected Compliance: good  Goals: follow meal plan prescribed, plan meals and snacks daily, Choose lower-calorie, lower-fat meal options at home and when dining out, food journal, do not skip meals or snacks, increase fluid intake 64 oz and 1200 calories  Monitor/Evaluation: weekly weight, food journal, fluid intake and exercise level  Active Problems    1  Adult BMI > 30 (V85 30) (E66 8)   2  Anxiety (300 00) (F41 9)   3  Depression with anxiety (300 4) (F41 8)   4  Glaucoma, narrow-angle (365 20,365 70) (H40 20X0)   5  Hyperlipidemia (272 4) (E78 5)   6  Need for prophylactic vaccination and inoculation against influenza (V04 81) (Z23)   7  Pap smear for cervical cancer screening (V76 2) (Z12 4)   8  Screening for lipoid disorders (V77 91) (Z13 220)   9  Uterine prolapse (618 1) (N81 4)   10  Visit for screening mammogram (V76 12) (Z12 31)   11  Vitamin D deficiency (268 9) (E55 9)   12  Weight gain (783 1) (R63 5)   13  Well adult on routine health check (V70 0) (Z00 00)    Past Medical History    1  History Of 2  Previous Pregnancies (V61 5)   2  History of chest pain (V13 89) (Z87 898)   3  History of Joint pain, knee (719 46) (M25 569)   4  History of Skin rash (782 1) (R21)    Surgical History    1  History of Oral Surgery Tooth Extraction    Family History  Mother    1  Family history of Chronic Obstructive Pulmonary Disease   2  Family history of Tobacco Use  Father    3  Family history of Bladder Cancer (V16 52)   4  Family history of Diabetes Mellitus (V18 0)   5  Family history of Skin Cancer (V16 8)    Social History    · Alcohol Use (History)   · Never a smoker    Current Meds   1  ALPRAZolam 0 25 MG Oral Tablet; TAKE 1 TABLET AT BEDTIME;    Therapy: 07WRQ4339 to (Evaluate:50Hmh4598)  Requested for: 31HVQ6175; Last   Rx:44Zac4242 Ordered   2  Sertraline HCl - 50 MG Oral Tablet; TAKE 1 5 TABLET Daily; Therapy: 83Jim9296 to (Evaluate:48Rwu3441)  Requested for: 97RAI9541; Last   Rx:05Jan2016 Ordered    Allergies    1  No Known Drug Allergies    2  No Known Environmental Allergies   3  No Known Food Allergies    Vitals  Signs   Recorded: 53Jvn2898 10:41AM   Height: 5 ft 5 in  Weight: 186 lb 4 8 oz  BMI Calculated: 31  BSA Calculated: 1 92    Future Appointments    Date/Time Provider Specialty Site   01/12/2017 03:30 PM Susan Cormier [address] [facility]   02/09/2017 03:30 PM VALERIE Garcia  Bariatric Medicine [facility]   10/16/2017 03:30 PM Ryan Stubbs DO Family Medicine [facility]     Signatures   Electronically signed by :  Jose Knowles, ; Dec 29 2016 10:54AM EST                       (Author)    Electronically signed by : VALERIE Spears ; Paddy  3 2017  1:06PM EST                       (Co-author)

## 2018-01-16 NOTE — PROGRESS NOTES
Chief Complaint  Chief Complaint Free Text Note Form: Patient here for [facility] follow up; patient reports she just completed a 10 day regimen of Abx for a root canal       History of Present Illness  HPI: Doing excellent with lifestyle changes  Busy with school play and making costumes so has not incorporated regular exercise as of yet      Past Medical History    1  History Of 2  Previous Pregnancies (V61 5)   2  History of chest pain (V13 89) (Z87 898)   3  History of Joint pain, knee (719 46) (M25 569)   4  History of Skin rash (782 1) (R21)    Assessment    1  Weight gain (783 1) (R63 5)   2  Depression with anxiety (300 4) (F41 8)   3  Hyperlipidemia (272 4) (E78 5)   4  Adult BMI > 30 (V85 30) (E66 8)    Discussion/Summary  Discussion Summary:   59 yo female w/ anxiety, depression, glaucoma and obesity here to pursue medical weight management to improve weight and health  Initial: 201 8  Current: 182  4  Change: -19 4lb (-10%)    Obesity class 1:  -Enrolled in [facility] program   -initial focus of 5-10% weight loss over 3-6 mos for improved health (met)  -screening labs-completed and within acceptable limits    Depression/anxiety: Stable  -On alprazolam as needed and Zoloft    History of glaucoma  -Cautious with phentermine    HPL: improved  -Tchol 228-->190, -->112 (1/2017)    RTC in 3 mos w/ me     Patient's Capacity to Self-Care: Patient is able to Self-Care  Understands and agrees with treatment plan: The treatment plan was reviewed with the patient/guardian  The patient/guardian understands and agrees with the treatment plan      Active Problems    1  Adult BMI > 30 (V85 30) (E66 8)   2  Anxiety (300 00) (F41 9)   3  Depression with anxiety (300 4) (F41 8)   4  Glaucoma, narrow-angle (365 20,365 70) (H40 20X0)   5  Hyperlipidemia (272 4) (E78 5)   6  Need for prophylactic vaccination and inoculation against influenza (V04 81) (Z23)   7  Pap smear for cervical cancer screening (V76 2) (Z12 4)   8  Screening for lipoid disorders (V77 91) (Z13 220)   9  Uterine prolapse (618 1) (N81 4)   10  Visit for screening mammogram (V76 12) (Z12 31)   11  Vitamin D deficiency (268 9) (E55 9)   12  Weight gain (783 1) (R63 5)   13  Well adult on routine health check (V70 0) (Z00 00)    Surgical History    1  History of Oral Surgery Tooth Extraction    Family History  Mother    1  Family history of Chronic Obstructive Pulmonary Disease   2  Family history of Tobacco Use  Father    3  Family history of Bladder Cancer (V16 52)   4  Family history of Diabetes Mellitus (V18 0)   5  Family history of Skin Cancer (V16 8)    Social History    · Alcohol Use (History)   · Never a smoker    Current Meds   1  ALPRAZolam 0 25 MG Oral Tablet; TAKE 1 TABLET AT BEDTIME; Therapy: 35RKK3063 to (Evaluate:23Vfk8924)  Requested for: 63JGY7378; Last   Rx:54Umt8523 Ordered   2  Sertraline HCl - 50 MG Oral Tablet; TAKE 1 5 TABLET Daily; Therapy: 87Xwt6993 to (Evaluate:05Zlt8201)  Requested for: 16EMF2086; Last   Rx:60Fgs0193 Ordered    Allergies    1  No Known Drug Allergies    2  No Known Environmental Allergies   3  No Known Food Allergies    Vitals  Vital Signs    Recorded: 82BES5814 03:03PM   Temperature 97 5 F   Heart Rate 72   Respiration 15   Systolic 624   Diastolic 66   Height 5 ft 5 in   Weight 182 lb 6 4 oz   BMI Calculated 30 35   BSA Calculated 1 9     Physical Exam    Constitutional   General appearance: No acute distress, well appearing and well nourished  well developed and obese  Eyes No conjunctival pallor  Ears, Nose, Mouth, and Throat Moist oral mucosa  Pulmonary   Respiratory effort: No increased work of breathing or signs of respiratory distress  Auscultation of lungs: Clear to auscultation  Cardiovascular   Auscultation of heart: Normal rate and rhythm, normal S1 and S2, without murmurs  Examination of extremities for edema and/or varicosities: Normal     Abdomen   Abdomen: Non-tender, no masses    The abdomen was obese  The abdomen was soft and nontender     Musculoskeletal   Gait and station: Normal     Psychiatric   Orientation to person, place, and time: Normal     Mood and affect: Normal          Future Appointments    Date/Time Provider Specialty Site   10/16/2017 03:30 PM Nisreencindi Temi, [address] [place] [address]     Signatures   Electronically signed by : VALERIE Tsang ; Feb 9 2017  3:25PM EST                       (Author)

## 2018-01-16 NOTE — PROGRESS NOTES
History of Present Illness  HPI: Abimael Polanco is here for f/u  Current wt 185 4 lbs, stable x 3 wks  Notes that prior to Thanksgiving weight was down ~2 more pounds  Overall loss of 18 lbs x 4 months (8 8%)  Has improved water intake since last visit  She continues to track and is generally consuming ~1200 calories  No physical activity incorporated but is parking further away from places  She would like weight to be 179 by the next visit in 2 weeks, this is a larger loss than she generally has and considering lack of physical inactivity may be an unrealistic goal for her  Discussed having back up goals so that she is not let down  Goal A: 179 lbs, Goal B: 182 lbs and Goal C: any loss or a nonscale goal such as kept up with water or was diligent with snacks  [facility]:   Weight Assessment: IBW: 125 lbs, ABW: 140 1 lbs, Weight Change: 18 lb loss x 4 months, Highest Weight: 203 4 lbs, Lowest Weight: 126 lbs, Goal Weight:  lbs,  lbs  Weight loss attempts: Weight Watchers: 25 lbs lbs and Other: portion control 20 lbs, LA Wt Loss 45 lbs  Excess calories come from in between meal snacking, large portions at mealtimes and high calorie high fat food choices  Dietary Recall:   Breakfast: high protein oatmeal    Snack: [place] high protein bar  Lunch: balanced breaks OR yogurt w/1/4 cup granola  Snack: popcorn  Dinner: Yonathan Primes, veggies  Snack: usually skip   Beverages: water, coffee  Estimated fluid intake: >48 oz water  Alcohol consumption: occasional    Food allergies/intolerances: n/a  Cooking: self  Shopping: self  She dines out occasionally  Exercise Frequency:  She does not exercise  Her obesity/being overweight is related to excess energy intake and as evidenced by BMI=30 9  Nutrition Prescription: Estimated calories for weight loss: REEvue 1685, wt loss w/o exercise 1190;  Tanita BMR 1469, wt loss w/o exercise 702-1202, Estimated daily protein needs: 68-85 gm (1 2-1 5 gm/kg IBW) and Estimated daily fluid needs: 66 oz (35 cc/kg IBW)  Nutrition Intervention: Counseling provided with emphasis on meal planning, portion sizes, healthy snack choices, hydration, protein intake and food journaling  Education materials provided: [employer]irection manual    Comprehension: good  Expected Compliance: good  Goals: follow meal plan prescribed, plan meals and snacks daily, Choose lower-calorie, lower-fat meal options at home and when dining out, food journal, do not skip meals or snacks and 1200 calories  Monitor/Evaluation: weekly weight, food journal, fluid intake and exercise level  Active Problems    1  Adult BMI > 30 (V85 30) (E66 8)   2  Anxiety (300 00) (F41 9)   3  Depression with anxiety (300 4) (F41 8)   4  Glaucoma, narrow-angle (365 20,365 70) (H40 20X0)   5  Hyperlipidemia (272 4) (E78 5)   6  Need for prophylactic vaccination and inoculation against influenza (V04 81) (Z23)   7  Pap smear for cervical cancer screening (V76 2) (Z12 4)   8  Screening for lipoid disorders (V77 91) (Z13 220)   9  Uterine prolapse (618 1) (N81 4)   10  Visit for screening mammogram (V76 12) (Z12 31)   11  Vitamin D deficiency (268 9) (E55 9)   12  Weight gain (783 1) (R63 5)   13  Well adult on routine health check (V70 0) (Z00 00)    Past Medical History    1  History Of 2  Previous Pregnancies (V61 5)   2  History of chest pain (V13 89) (Z87 898)   3  History of Joint pain, knee (719 46) (M25 569)   4  History of Skin rash (782 1) (R21)    Surgical History    1  History of Oral Surgery Tooth Extraction    Family History  Mother    1  Family history of Chronic Obstructive Pulmonary Disease   2  Family history of Tobacco Use  Father    3  Family history of Bladder Cancer (V16 52)   4  Family history of Diabetes Mellitus (V18 0)   5  Family history of Skin Cancer (V16 8)    Social History    · Alcohol Use (History)   · Never a smoker    Current Meds   1  ALPRAZolam 0 25 MG Oral Tablet; TAKE 1 TABLET AT BEDTIME; Therapy: 70NSX9454 to (Evaluate:84Uwk1921)  Requested for: 25ZAK9692; Last   Rx:58Ozp9400 Ordered   2  Sertraline HCl - 50 MG Oral Tablet; TAKE 1 5 TABLET Daily; Therapy: 93Eqz6718 to (Evaluate:77Stb3696)  Requested for: 77HMF0565; Last   Rx:10Qhf2070 Ordered    Allergies    1  No Known Drug Allergies    2  No Known Environmental Allergies   3  No Known Food Allergies    Vitals  Signs   Recorded: 52JBA6924 03:56PM   Height: 5 ft 5 in  Weight: 185 lb 6 4 oz  BMI Calculated: 30 85  BSA Calculated: 1 92    Future Appointments    Date/Time Provider Specialty Site   12/15/2016 03:30 PM Susan Cormier [facility]   02/09/2017 03:30 PM VALERIE Urban  Bariatric Medicine [facility]   10/16/2017 03:30 PM Donavan Daly DO Family [facility] [address] [facility]     Signatures   Electronically signed by :  Kp Terry, ; Dec  1 2016  4:16PM EST                       (Author)    Electronically signed by : VALERIE Ojeda ; Dec 12 2016 11:06AM EST                       (Co-author)

## 2018-01-18 NOTE — RESULT NOTES
[employer]   Labs okay for new direction program  Schedule a new start visit  (12 week follow-up)     Verified Results  (1) COMPREHENSIVE METABOLIC PANEL 06NNX6970 20:90OJ Canelo Smith, [address] [place] [address] Order Number: [id number]  TW Order Number: [id number] Order Number: [id number] Order Number: [id number]     Test Name Result Flag Reference   GLUCOSE,RANDM 94 mg/dL     If the patient is fasting, the ADA then defines impaired fasting glucose as > 100 mg/dL and diabetes as > or equal to 123 mg/dL  SODIUM 137 mmol/L  136-145   POTASSIUM 3 9 mmol/L  3 5-5 3   CHLORIDE 101 mmol/L  100-108   CARBON DIOXIDE 30 mmol/L  21-32   ANION GAP (CALC) 6 mmol/L  4-13   BLOOD UREA NITROGEN 10 mg/dL  5-25   CREATININE 0 86 mg/dL  0 60-1 30   Standardized to IDMS reference method   CALCIUM 9 4 mg/dL  8 3-10 1   BILI, TOTAL 0 70 mg/dL  0 20-1 00   ALK PHOSPHATAS 63 U/L     ALT (SGPT) 32 U/L  12-78   AST(SGOT) 20 U/L  5-45   ALBUMIN 3 5 g/dL  3 5-5 0   TOTAL PROTEIN 7 9 g/dL  6 4-8 2   eGFR Non-African American      >60 0 ml/min/1 [address] [facility] Disease Education Program recommendations are as follows:  GFR calculation is accurate only with a steady state creatinine  Chronic Kidney disease less than 60 ml/min/1 73 sq  meters  Kidney failure less than 15 ml/min/1 73 sq  meters  (1) HEMOGLOBIN A1C 13Qre7125 09:21AM Alvena Sessions   TW Order Number: [id number]  TW Order Number: [id number]     Test Name Result Flag Reference   HEMOGLOBIN A1C 5 2 %  4 2-6 3   EST  AVG   GLUCOSE 103 mg/dl       (1) LIPID PANEL, FASTING 72MGV5817 09:21AM Alvena Sessions   TW Order Number: [id number]  TW Order Number: [id number] Order Number: [id number] Order Number: [id number]     Test Name Result Flag Reference   CHOLESTEROL 228 mg/dL H    HDL,DIRECT 72 mg/dL H 40-60   Specimen collection should occur prior to Metamizole administration due to the potential for falsely depressed results  LDL CHOLESTEROL CALCULATED 144 mg/dL H 0-100   Triglyceride:         Normal              <150 mg/dl       Borderline High    150-199 mg/dl       High               200-499 mg/dl       Very High          >499 mg/dl  Cholesterol:         Desirable        <200 mg/dl      Borderline High  200-239 mg/dl      High             >239 mg/dl  HDL Cholesterol:        High    >59 mg/dL      Low     <41 mg/dL  LDL CALCULATED:    This screening LDL is a calculated result  It does not have the accuracy of the Direct Measured LDL in the monitoring of patients with hyperlipidemia and/or statin therapy  Direct Measure LDL (LBX446) must be ordered separately in these patients  TRIGLYCERIDES 60 mg/dL  <=150   Specimen collection should occur prior to N-Acetylcysteine or Metamizole administration due to the potential for falsely depressed results  (1) TSH WITH FT4 REFLEX 07Jul2016 09:21AM [place] [facility] Order Number: [id number]  TW Order Number: [id number] Order Number: [id number] Order Number: [id number]     Test Name Result Flag Reference   TSH 2 474 uIU/mL  0 358-3 740   Patients undergoing fluorescein dye angiography may retain small amounts of fluorescein in the body for 48-72 hours post procedure  Samples containing fluorescein can produce falsely depressed TSH values  If the patient had this procedure,a specimen should be resubmitted post fluorescein clearance            The recommended reference ranges for TSH during pregnancy are as follows:  First trimester 0 1 to 2 5 uIU/mL  Second trimester  0 2 to 3 0 uIU/mL  Third trimester 0 3 to 3 0 uIU/m     (1) INSULIN, FASTING 07Jul2016 09:21AM [place] [facility] Order Number: [id number]  TW Order Number: [id number]     Test Name Result Flag Reference   INSULIN, FASTING 12 7 mU/L  3 0-25 0

## 2018-01-22 VITALS
TEMPERATURE: 97.5 F | RESPIRATION RATE: 15 BRPM | HEART RATE: 72 BPM | BODY MASS INDEX: 30.39 KG/M2 | WEIGHT: 182.4 LBS | SYSTOLIC BLOOD PRESSURE: 112 MMHG | DIASTOLIC BLOOD PRESSURE: 66 MMHG | HEIGHT: 65 IN

## 2018-02-12 DIAGNOSIS — F41.9 ANXIETY: Primary | ICD-10-CM

## 2018-02-12 DIAGNOSIS — F32.A DEPRESSION, UNSPECIFIED DEPRESSION TYPE: ICD-10-CM

## 2018-02-12 RX ORDER — ALPRAZOLAM 0.25 MG/1
1 TABLET ORAL
COMMUNITY
Start: 2012-02-19 | End: 2018-02-12 | Stop reason: SDUPTHER

## 2018-02-12 RX ORDER — ALPRAZOLAM 0.25 MG/1
TABLET ORAL
Qty: 30 TABLET | Refills: 0 | OUTPATIENT
Start: 2018-02-12 | End: 2019-04-22 | Stop reason: ALTCHOICE

## 2018-03-20 ENCOUNTER — OFFICE VISIT (OUTPATIENT)
Dept: FAMILY MEDICINE CLINIC | Facility: CLINIC | Age: 58
End: 2018-03-20
Payer: COMMERCIAL

## 2018-03-20 VITALS
HEART RATE: 72 BPM | BODY MASS INDEX: 33.49 KG/M2 | HEIGHT: 65 IN | DIASTOLIC BLOOD PRESSURE: 80 MMHG | RESPIRATION RATE: 18 BRPM | SYSTOLIC BLOOD PRESSURE: 126 MMHG | WEIGHT: 201 LBS

## 2018-03-20 DIAGNOSIS — Z00.00 WELL ADULT EXAM: Primary | ICD-10-CM

## 2018-03-20 DIAGNOSIS — E78.5 HYPERLIPIDEMIA, UNSPECIFIED HYPERLIPIDEMIA TYPE: ICD-10-CM

## 2018-03-20 DIAGNOSIS — E55.9 VITAMIN D DEFICIENCY: ICD-10-CM

## 2018-03-20 PROCEDURE — 99396 PREV VISIT EST AGE 40-64: CPT | Performed by: FAMILY MEDICINE

## 2018-03-20 NOTE — PROGRESS NOTES
Assessment/Plan:         Diagnoses and all orders for this visit:    Well adult exam  Comments:  up to date with health maintanence    Hyperlipidemia, unspecified hyperlipidemia type  -     CBC; Future  -     Comprehensive metabolic panel; Future  -     Lipid Panel with Direct LDL reflex; Future    Vitamin D deficiency  -     TSH, 3rd generation with T4 reflex; Future  -     Vitamin D 25 hydroxy; Future          Subjective:      Patient ID: Fredy Mendoza is a 59 y o  female  Well Adult Physical   Patient here for a comprehensive physical exam The patient reports no problems    Do you take any herbs or supplements that were not prescribed by a doctor? no   Are you taking calcium supplements? no   Are you taking aspirin daily? no     History:  LMP: No LMP recorded  Patient is postmenopausal   Menopause at 52 years  Last pap date:   Abnormal pap? no  : 3  Para: 2  , term  colonscopy up to date  Labs needed for this year          The following portions of the patient's history were reviewed and updated as appropriate: allergies, current medications, past family history, past medical history, past social history, past surgical history and problem list     Review of Systems   Constitutional: Negative  HENT: Negative  Eyes: Negative  Respiratory: Negative  Cardiovascular: Negative  Gastrointestinal: Negative  Endocrine: Negative  Genitourinary: Negative  Musculoskeletal: Negative  Skin: Negative  Allergic/Immunologic: Negative  Neurological: Negative  Hematological: Negative  Psychiatric/Behavioral: Negative  Objective:      /80 (BP Location: Right arm, Patient Position: Sitting, Cuff Size: Standard)   Pulse 72   Resp 18   Ht 5' 5" (1 651 m)   Wt 91 2 kg (201 lb)   BMI 33 45 kg/m²          Physical Exam   Constitutional: She is oriented to person, place, and time  Vital signs are normal  She appears well-developed and well-nourished     HENT: Head: Normocephalic and atraumatic  Nose: Nose normal    Mouth/Throat: Oropharynx is clear and moist    Eyes: Conjunctivae, EOM and lids are normal  Pupils are equal, round, and reactive to light  Neck: Normal range of motion  Neck supple  Cardiovascular: Normal rate, regular rhythm, S1 normal, S2 normal, normal heart sounds and intact distal pulses  Pulmonary/Chest: Effort normal and breath sounds normal    Abdominal: Soft  Bowel sounds are normal    Musculoskeletal: Normal range of motion  Neurological: She is alert and oriented to person, place, and time  She has normal strength and normal reflexes  Skin: Skin is warm, dry and intact  Psychiatric: She has a normal mood and affect  Her speech is normal and behavior is normal  Judgment and thought content normal  Cognition and memory are normal    Nursing note and vitals reviewed

## 2018-03-20 NOTE — PATIENT INSTRUCTIONS

## 2018-08-08 DIAGNOSIS — F32.A DEPRESSION, UNSPECIFIED DEPRESSION TYPE: ICD-10-CM

## 2018-09-20 ENCOUNTER — OFFICE VISIT (OUTPATIENT)
Dept: FAMILY MEDICINE CLINIC | Facility: CLINIC | Age: 58
End: 2018-09-20
Payer: COMMERCIAL

## 2018-09-20 VITALS
TEMPERATURE: 97.7 F | RESPIRATION RATE: 14 BRPM | WEIGHT: 204.4 LBS | HEART RATE: 64 BPM | DIASTOLIC BLOOD PRESSURE: 74 MMHG | OXYGEN SATURATION: 94 % | BODY MASS INDEX: 34.89 KG/M2 | SYSTOLIC BLOOD PRESSURE: 120 MMHG | HEIGHT: 64 IN

## 2018-09-20 DIAGNOSIS — E55.9 VITAMIN D DEFICIENCY: ICD-10-CM

## 2018-09-20 DIAGNOSIS — E78.5 HYPERLIPIDEMIA, UNSPECIFIED HYPERLIPIDEMIA TYPE: ICD-10-CM

## 2018-09-20 DIAGNOSIS — Z23 NEED FOR INFLUENZA VACCINATION: Primary | ICD-10-CM

## 2018-09-20 DIAGNOSIS — E66.9 OBESITY, CLASS I, BMI 30-34.9: ICD-10-CM

## 2018-09-20 PROCEDURE — 90471 IMMUNIZATION ADMIN: CPT

## 2018-09-20 PROCEDURE — 1036F TOBACCO NON-USER: CPT | Performed by: FAMILY MEDICINE

## 2018-09-20 PROCEDURE — 3008F BODY MASS INDEX DOCD: CPT | Performed by: FAMILY MEDICINE

## 2018-09-20 PROCEDURE — 90682 RIV4 VACC RECOMBINANT DNA IM: CPT

## 2018-09-20 PROCEDURE — 99214 OFFICE O/P EST MOD 30 MIN: CPT | Performed by: FAMILY MEDICINE

## 2018-09-20 NOTE — PROGRESS NOTES
Assessment/Plan:    Problem List Items Addressed This Visit     Hyperlipidemia     Check levels         Vitamin D deficiency     Check levels         Obesity, Class I, BMI 30-34 9     Need to change diet  And increase exercise           Other Visit Diagnoses     Need for influenza vaccination    -  Primary    Relevant Orders    influenza vaccine, [id number], quadrivalent, recombinant, PF, 0 5 mL, for patients 18 yr+ ([facility])          Patient Instructions   Hyperlipidemia   AMBULATORY CARE:   Hyperlipidemia  is a high level of lipids (fats) in your blood  These lipids include cholesterol or triglycerides  Lipids are made by your body  They also come from the foods you eat  Your body needs lipids to work properly, but high levels increase your risk for heart disease, heart attack, and stroke  Call 911 for any of the following:   · You have any of the following signs of a heart attack:      ¨ Squeezing, pressure, or pain in your chest that lasts longer than 5 minutes or returns    ¨ Discomfort or pain in your back, neck, jaw, stomach, or arm     ¨ Trouble breathing    ¨ Nausea or vomiting    ¨ Lightheadedness or a sudden cold sweat, especially with chest pain or trouble breathing    · You have any of the following signs of a stroke:      ¨ Numbness or drooping on one side of your face     ¨ Weakness in an arm or leg    ¨ Confusion or difficulty speaking    ¨ Dizziness, a severe headache, or vision loss  Contact your healthcare provider if:   · You have questions or concerns about your condition or care  Treatment of hyperlipidemia  may first include lifestyle changes to help decrease your lipid levels  You may also need to take medicine to lower your lipid levels  Some of the lifestyle changes you may need to make include the following:  · Maintain a healthy weight  Ask your healthcare provider how much you should weigh  Ask him or her to help you create a weight loss plan if you are overweight   Weight loss can decrease your cholesterol and triglyceride levels  · Exercise as directed  Exercise lowers your cholesterol levels and helps you maintain a healthy weight  Get 40 minutes or more of moderate exercise 3 to 4 days each week  You can split your exercise into four 10-minute workouts instead of 40 minutes at one time  Examples of moderate exercises include walking briskly, swimming, or riding a bike  Work with your healthcare provider to plan the best exercise program for you  · Do not smoke  Nicotine and other chemicals in cigarettes and cigars can increase your risk for a heart attack and stroke  Ask your healthcare provider for information if you currently smoke and need help to quit  E-cigarettes or smokeless tobacco still contain nicotine  Talk to your healthcare provider before you use these products  · Eat heart-healthy foods  Talk to your dietitian about a heart-healthy diet  The following will help you manage hyperlipidemia:     ¨ Decrease the total amount of fat you eat  Choose lean meats, fat-free or 1% fat milk, and low-fat dairy products, such as yogurt and cheese  Limit or do not eat red meat  Red meats are high in fat and cholesterol  ¨ Replace unhealthy fats with healthy fats  Unhealthy fats include saturated fat, trans fat, and cholesterol  Choose soft margarines that are low in saturated fat and have little or no trans fat  Monounsaturated fats are healthy fats  These are found in olive oil, canola oil, avocado, and nuts  Polyunsaturated fats are also healthy  These are found in fish, flaxseed, walnuts, and soybeans  ¨ Eat fruits and vegetables every day  They are low in calories and fat and a good source of essential vitamins  Include dark green, red, and orange vegetables  Examples include spinach, kale, broccoli, and carrots  ¨ Eat foods high in fiber  Choose whole grain, high-fiber foods   Good choices include whole-wheat breads or cereals, beans, peas, fruits, and vegetables  · Ask your healthcare provider if it is safe for you to drink alcohol  Alcohol can increase your cholesterol and triglyceride levels  A drink of alcohol is 12 ounces of beer, 5 ounces of wine, or 1½ ounces of liquor  Follow up with your healthcare provider as directed: You may need to return for more tests  Your healthcare provider may refer you to a dietitian  Write down your questions so you remember to ask them during your visits  © 2017 [employer]  Information is for End User's use only and may not be sold, redistributed or otherwise used for commercial purposes  All illustrations and images included in CareNotes® are the copyrighted property of A D [employer] M [employer] Inc  or [address]  The above information is an  only  It is not intended as medical advice for individual conditions or treatments  Talk to your doctor, nurse or pharmacist before following any medical regimen to see if it is safe and effective for you  No Follow-up on file  Subjective:      Patient ID: Inocencia Valdez is a 59 y o  female  Chief Complaint   Patient presents with    Follow-up     Patient here for 6 month follow up on Anxiety, Depression       Here for follow up  Doing well  Concerned about weight        Hyperlipidemia   This is a chronic problem  The current episode started more than 1 year ago  The problem is controlled  Recent lipid tests were reviewed and are normal  There are no known factors aggravating her hyperlipidemia  She is currently on no antihyperlipidemic treatment  The current treatment provides significant improvement of lipids  There are no compliance problems  The following portions of the patient's history were reviewed and updated as appropriate: allergies, current medications, past family history, past medical history, past social history, past surgical history and problem list     Review of Systems   Constitutional: Negative      HENT: Negative  Eyes: Negative  Respiratory: Negative  Cardiovascular: Negative  Gastrointestinal: Negative  Endocrine: Negative  Genitourinary: Negative  Musculoskeletal: Negative  Skin: Negative  Allergic/Immunologic: Negative  Neurological: Negative  Hematological: Negative  Psychiatric/Behavioral: Negative  Current Outpatient Prescriptions   Medication Sig Dispense Refill    ALPRAZolam (XANAX) 0 25 mg tablet Take 1 tablet by mouth 30 tablet 0    sertraline (ZOLOFT) 50 mg tablet TAKE 1 5 TABLETS BY MOUTH DAILY 135 tablet 1     No current facility-administered medications for this visit  Objective:    /74   Pulse 64   Temp 97 7 °F (36 5 °C)   Resp 14   Ht 5' 4 49" (1 638 m)   Wt 92 7 kg (204 lb 6 4 oz)   SpO2 94%   BMI 34 56 kg/m²        Physical Exam   Constitutional: She appears well-developed and well-nourished  HENT:   Head: Normocephalic  Eyes: Pupils are equal, round, and reactive to light  Neck: Normal range of motion  Neck supple  Cardiovascular: Normal rate, regular rhythm, normal heart sounds and intact distal pulses  Pulmonary/Chest: Effort normal and breath sounds normal    Abdominal: Soft  Bowel sounds are normal    Musculoskeletal: Normal range of motion  Neurological: She is alert  Skin: Skin is warm and dry  Psychiatric: She has a normal mood and affect  Her behavior is normal  Judgment and thought content normal    Nursing note and vitals reviewed               Casie Roque DO

## 2018-09-20 NOTE — PATIENT INSTRUCTIONS

## 2019-01-01 NOTE — PROGRESS NOTES
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 to incorporate exercise due to class schedule  Encouraged to do so on the weekends and then aim for 1 school day  She also often skips one of her daily snacks due to time constraints (most often in the a m )  Hydration has declined and this was evident with Tanita  She is drinking about 1/2 of what she was last time  Is not able to leave class to use bathroom which has caused her to drink less  She has a plan moving forward to increase this  Bariatric [facility]NT [facility]:   Weight Assessment: IBW: 125 lbs, ABW: 139 9 lbs, Weight Change: 18 8 lb loss x 3 m, Highest Weight: 203 4 lbs, Lowest Weight: 126 lbs, Goal Weight:  lbs,  lbs  Weight loss attempts: Weight Watchers: 25 lbs and Other: portion control 20 lbs, LA Wt Loss 45 lbs  Excess calories come from in between meal snacking, large portions at mealtimes and high calorie high fat food choices  Dietary Recall:   Breakfast: multigrain english muffin w/2 TBSP PB2, 1 TBSP strawberry jam    Snack: skip  Lunch: balanced breaks OR yogurt w/granola  Snack: oat/chocolate bar  Dinner: 4 oz lean pro, veggies, 1 carb  Snack: SF chocolate pudding   Beverages: water, coffee  Estimated fluid intake: 32 oz water  Alcohol consumption: w/e  Food allergies/intolerances: n/a  Cooking: self  Shopping: self  She dines out occasionally  Exercise Frequency:  She does not exercise  Her obesity/being overweight is related to excess energy intake and as evidenced by BMI=30 7     Nutrition Prescription: Estimated calories for weight loss: REEvue DCH=0096, wt loss w/o exercise 1190, w/exercise 1366; Tanita BMR 1469, wt loss w/o exercise 702-1202, wt loss w/exercise 950-1450, Estimated daily protein needs: 68-85 gm (1 2-1 5 gm/kg IBW) and Estimated daily fluid needs: 66 oz (35 cc/kg IBW)  Nutrition Intervention: Counseling provided with emphasis on meal planning, portion sizes, healthy snack choices, hydration, protein intake, exercise and food journaling  Education materials provided: [employer] manual    Comprehension: good  Expected Compliance: good  Goals: follow meal plan prescribed, plan meals and snacks daily, food journal, do not skip meals or snacks, increase fluid intake 66 oz and 1200 calories nonexercise, 1350 exercise   Monitor/Evaluation: weekly weight, food journal, fluid intake and exercise level  Active Problems    1  Adult BMI > 30 (V85 30) (E66 8)   2  Anxiety (300 00) (F41 9)   3  Depression with anxiety (300 4) (F41 8)   4  Glaucoma, narrow-angle (365 20,365 70) (H40 20X0)   5  Hyperlipidemia (272 4) (E78 5)   6  Need for prophylactic vaccination and inoculation against influenza (V04 81) (Z23)   7  Pap smear for cervical cancer screening (V76 2) (Z12 4)   8  Screening for lipoid disorders (V77 91) (Z13 220)   9  Uterine prolapse (618 1) (N81 4)   10  Visit for screening mammogram (V76 12) (Z12 31)   11  Vitamin D deficiency (268 9) (E55 9)   12  Weight gain (783 1) (R63 5)   13  Well adult on routine health check (V70 0) (Z00 00)    Past Medical History    1  History Of 2  Previous Pregnancies (V61 5)   2  History of chest pain (V13 89) (Z87 898)   3  History of Joint pain, knee (719 46) (M25 569)   4  History of Skin rash (782 1) (R21)    Surgical History    1  History of Oral Surgery Tooth Extraction    Family History  Mother    1  Family history of Chronic Obstructive Pulmonary Disease   2  Family history of Tobacco Use  Father    3  Family history of Bladder Cancer (V16 52)   4   Family history of Diabetes Mellitus (V18 0)   5  Family history of Skin Cancer (V16 8)    Social History    · Alcohol Use (History)   · Never a smoker    Current Meds   1  ALPRAZolam 0 25 MG Oral Tablet; TAKE 1 TABLET AT BEDTIME; Therapy: 72ICG9289 to (Evaluate:64Dli9542)  Requested for: 46JMW0244; Last   Rx:[id number] Ordered   2  Sertraline HCl - 50 MG Oral Tablet; TAKE 1 5 TABLET Daily; Therapy: 64Ags7750 to (Evaluate:77Ahh6962)  Requested for: 07IVQ1823; Last   Rx:[id number] Ordered    Allergies    1  No Known Drug Allergies    2  No Known Environmental Allergies   3  No Known Food Allergies    Vitals  Signs   Recorded: 81DMJ4312 09:15AM   Height: 5 ft 5 in  Weight: 184 lb 9 6 oz  BMI Calculated: 30 72  BSA Calculated: 1 91  Recorded: 20JUH0116 09:14AM   Waist Circumference: 40 25    Results/Data  Metabolic Analyzer - POC [id number] 10:51AM [employer]     Test Name Result Flag Reference   Metabolic Analyzer 8624       Summary / No summary entered :      No summary entered  Documents attached :      Deshawn Bolton: [id number] - Appointment - Boothchar[facility] Prado -      (Bariatric Medicine) (Additional Information Document)  Tanita Flowsheet [id number] 09:13AM [employer]     Test Name Result Flag Reference   Height 65     Weight 184 6     Body Fat % 42 7     Fat Mass 78 8     FFM ( Fat Free Mass) 105 8     Muscle Mass 100 4     TBW ( Total Body Water) 70 0     TBW % 37 9     Bone Mass 5 4     BMR ( Basal Metabolic Rate) 4845     Metabolic Age 66     Visceral Fat Rating 11     BMI 30 7         Future Appointments    Date/Time Provider Specialty Site   12/01/2016 03:30 PM [facility]  [facility]   12/15/2016 03:30 PM Susan Cormier [facility]   02/09/2017 03:30 PM VALERIE Lucas  Bariatric Medicine [facility]   10/16/2017 03:30 PM Edi Blevins DO Family Medicine [address] [facility]     Signatures   Electronically signed by :  Aaliyah Kristin Hawkins, ; Nov 9 2016 10:46AM EST                       (Author)    Electronically signed by : VALERIE Tsang ; Nov 9 2016  4:50PM EST                       (Co-author) yes

## 2019-01-02 ENCOUNTER — TRANSCRIBE ORDERS (OUTPATIENT)
Dept: ADMINISTRATIVE | Facility: HOSPITAL | Age: 59
End: 2019-01-02

## 2019-01-02 DIAGNOSIS — Z12.39 BREAST SCREENING: Primary | ICD-10-CM

## 2019-01-05 ENCOUNTER — APPOINTMENT (OUTPATIENT)
Dept: LAB | Facility: CLINIC | Age: 59
End: 2019-01-05
Payer: COMMERCIAL

## 2019-01-05 DIAGNOSIS — E78.5 HYPERLIPIDEMIA, UNSPECIFIED HYPERLIPIDEMIA TYPE: ICD-10-CM

## 2019-01-05 DIAGNOSIS — E55.9 VITAMIN D DEFICIENCY: ICD-10-CM

## 2019-01-05 LAB
25(OH)D3 SERPL-MCNC: 29.5 NG/ML (ref 30–100)
ALBUMIN SERPL BCP-MCNC: 3.7 G/DL (ref 3.5–5)
ALP SERPL-CCNC: 71 U/L (ref 46–116)
ALT SERPL W P-5'-P-CCNC: 36 U/L (ref 12–78)
ANION GAP SERPL CALCULATED.3IONS-SCNC: 10 MMOL/L (ref 4–13)
AST SERPL W P-5'-P-CCNC: 18 U/L (ref 5–45)
BILIRUB SERPL-MCNC: 0.8 MG/DL (ref 0.2–1)
BUN SERPL-MCNC: 12 MG/DL (ref 5–25)
CALCIUM SERPL-MCNC: 9.4 MG/DL (ref 8.3–10.1)
CHLORIDE SERPL-SCNC: 101 MMOL/L (ref 100–108)
CHOLEST SERPL-MCNC: 230 MG/DL (ref 50–200)
CO2 SERPL-SCNC: 29 MMOL/L (ref 21–32)
CREAT SERPL-MCNC: 0.86 MG/DL (ref 0.6–1.3)
ERYTHROCYTE [DISTWIDTH] IN BLOOD BY AUTOMATED COUNT: 12.9 % (ref 11.6–15.1)
GFR SERPL CREATININE-BSD FRML MDRD: 74 ML/MIN/1.73SQ M
GLUCOSE P FAST SERPL-MCNC: 95 MG/DL (ref 65–99)
HCT VFR BLD AUTO: 45.4 % (ref 34.8–46.1)
HDLC SERPL-MCNC: 67 MG/DL (ref 40–60)
HGB BLD-MCNC: 14.5 G/DL (ref 11.5–15.4)
LDLC SERPL CALC-MCNC: 149 MG/DL (ref 0–100)
MCH RBC QN AUTO: 30.1 PG (ref 26.8–34.3)
MCHC RBC AUTO-ENTMCNC: 31.9 G/DL (ref 31.4–37.4)
MCV RBC AUTO: 94 FL (ref 82–98)
PLATELET # BLD AUTO: 286 THOUSANDS/UL (ref 149–390)
PMV BLD AUTO: 11.3 FL (ref 8.9–12.7)
POTASSIUM SERPL-SCNC: 3.8 MMOL/L (ref 3.5–5.3)
PROT SERPL-MCNC: 7.7 G/DL (ref 6.4–8.2)
RBC # BLD AUTO: 4.81 MILLION/UL (ref 3.81–5.12)
SODIUM SERPL-SCNC: 140 MMOL/L (ref 136–145)
TRIGL SERPL-MCNC: 72 MG/DL
TSH SERPL DL<=0.05 MIU/L-ACNC: 2.08 UIU/ML (ref 0.36–3.74)
WBC # BLD AUTO: 4.58 THOUSAND/UL (ref 4.31–10.16)

## 2019-01-05 PROCEDURE — 82306 VITAMIN D 25 HYDROXY: CPT

## 2019-01-05 PROCEDURE — 84443 ASSAY THYROID STIM HORMONE: CPT

## 2019-01-05 PROCEDURE — 80061 LIPID PANEL: CPT

## 2019-01-05 PROCEDURE — 85027 COMPLETE CBC AUTOMATED: CPT

## 2019-01-05 PROCEDURE — 36415 COLL VENOUS BLD VENIPUNCTURE: CPT

## 2019-01-05 PROCEDURE — 80053 COMPREHEN METABOLIC PANEL: CPT

## 2019-01-08 ENCOUNTER — HOSPITAL ENCOUNTER (OUTPATIENT)
Dept: MAMMOGRAPHY | Facility: HOSPITAL | Age: 59
Discharge: HOME | End: 2019-01-08
Payer: COMMERCIAL

## 2019-01-08 VITALS — HEIGHT: 65 IN | WEIGHT: 204 LBS | BODY MASS INDEX: 33.99 KG/M2

## 2019-01-08 DIAGNOSIS — Z12.39 BREAST SCREENING: ICD-10-CM

## 2019-01-08 PROCEDURE — 77067 SCR MAMMO BI INCL CAD: CPT

## 2019-04-22 ENCOUNTER — OFFICE VISIT (OUTPATIENT)
Dept: FAMILY MEDICINE CLINIC | Facility: CLINIC | Age: 59
End: 2019-04-22
Payer: COMMERCIAL

## 2019-04-22 VITALS
BODY MASS INDEX: 33.68 KG/M2 | OXYGEN SATURATION: 96 % | SYSTOLIC BLOOD PRESSURE: 110 MMHG | RESPIRATION RATE: 12 BRPM | HEART RATE: 75 BPM | DIASTOLIC BLOOD PRESSURE: 80 MMHG | HEIGHT: 66 IN | WEIGHT: 209.6 LBS

## 2019-04-22 DIAGNOSIS — Z11.59 ENCOUNTER FOR HEPATITIS C SCREENING TEST FOR LOW RISK PATIENT: ICD-10-CM

## 2019-04-22 DIAGNOSIS — E78.2 MIXED HYPERLIPIDEMIA: ICD-10-CM

## 2019-04-22 DIAGNOSIS — E66.9 OBESITY, CLASS I, BMI 30-34.9: ICD-10-CM

## 2019-04-22 DIAGNOSIS — Z00.00 ANNUAL PHYSICAL EXAM: Primary | ICD-10-CM

## 2019-04-22 DIAGNOSIS — F33.42 RECURRENT MAJOR DEPRESSIVE DISORDER, IN FULL REMISSION (HCC): ICD-10-CM

## 2019-04-22 PROCEDURE — 99396 PREV VISIT EST AGE 40-64: CPT | Performed by: FAMILY MEDICINE

## 2019-04-22 RX ORDER — RANITIDINE 150 MG/1
150 TABLET ORAL 2 TIMES DAILY
COMMUNITY
End: 2020-03-02 | Stop reason: HOSPADM

## 2019-08-22 ENCOUNTER — LAB REQUISITION (OUTPATIENT)
Dept: LAB | Facility: HOSPITAL | Age: 59
End: 2019-08-22
Payer: COMMERCIAL

## 2019-08-22 DIAGNOSIS — B95.8 UNSPECIFIED STAPHYLOCOCCUS AS THE CAUSE OF DISEASES CLASSIFIED ELSEWHERE: ICD-10-CM

## 2019-08-22 PROCEDURE — 87186 SC STD MICRODIL/AGAR DIL: CPT | Performed by: PHYSICIAN ASSISTANT

## 2019-08-22 PROCEDURE — 87147 CULTURE TYPE IMMUNOLOGIC: CPT | Performed by: PHYSICIAN ASSISTANT

## 2019-08-22 PROCEDURE — 87070 CULTURE OTHR SPECIMN AEROBIC: CPT | Performed by: PHYSICIAN ASSISTANT

## 2019-08-22 PROCEDURE — 87205 SMEAR GRAM STAIN: CPT | Performed by: PHYSICIAN ASSISTANT

## 2019-08-24 LAB
BACTERIA WND AEROBE CULT: ABNORMAL
GRAM STN SPEC: ABNORMAL

## 2019-08-26 ENCOUNTER — TELEPHONE (OUTPATIENT)
Dept: FAMILY MEDICINE CLINIC | Facility: CLINIC | Age: 59
End: 2019-08-26

## 2019-08-28 ENCOUNTER — OFFICE VISIT (OUTPATIENT)
Dept: FAMILY MEDICINE CLINIC | Facility: CLINIC | Age: 59
End: 2019-08-28
Payer: COMMERCIAL

## 2019-08-28 DIAGNOSIS — Z23 NEED FOR TUBERCULOSIS VACCINATION: Primary | ICD-10-CM

## 2019-08-28 PROCEDURE — 86580 TB INTRADERMAL TEST: CPT

## 2019-08-28 NOTE — PROGRESS NOTES
Assessment/Plan:    No problem-specific Assessment & Plan notes found for this encounter  Diagnoses and all orders for this visit:    Need for tuberculosis vaccination  -     TB Skin Test          Subjective:      Patient ID: Hattie Wu is a 62 y o  female  HPI        Review of Systems      Objective: There were no vitals taken for this visit           Physical Exam

## 2019-08-30 ENCOUNTER — OFFICE VISIT (OUTPATIENT)
Dept: FAMILY MEDICINE CLINIC | Facility: CLINIC | Age: 59
End: 2019-08-30

## 2019-08-30 ENCOUNTER — OFFICE VISIT (OUTPATIENT)
Dept: URGENT CARE | Facility: CLINIC | Age: 59
End: 2019-08-30
Payer: COMMERCIAL

## 2019-08-30 VITALS
HEART RATE: 64 BPM | BODY MASS INDEX: 33.91 KG/M2 | SYSTOLIC BLOOD PRESSURE: 122 MMHG | HEIGHT: 66 IN | WEIGHT: 211 LBS | RESPIRATION RATE: 16 BRPM | TEMPERATURE: 97.5 F | DIASTOLIC BLOOD PRESSURE: 59 MMHG

## 2019-08-30 DIAGNOSIS — K08.89 PAIN, DENTAL: Primary | ICD-10-CM

## 2019-08-30 DIAGNOSIS — Z11.1 ENCOUNTER FOR PPD SKIN TEST READING: Primary | ICD-10-CM

## 2019-08-30 LAB
INDURATION: 0 MM
TB SKIN TEST: NEGATIVE

## 2019-08-30 PROCEDURE — RECHECK: Performed by: FAMILY MEDICINE

## 2019-08-30 PROCEDURE — S9083 URGENT CARE CENTER GLOBAL: HCPCS | Performed by: NURSE PRACTITIONER

## 2019-08-30 PROCEDURE — G0382 LEV 3 HOSP TYPE B ED VISIT: HCPCS | Performed by: NURSE PRACTITIONER

## 2019-08-30 RX ORDER — CLINDAMYCIN HYDROCHLORIDE 300 MG/1
300 CAPSULE ORAL 3 TIMES DAILY
Qty: 30 CAPSULE | Refills: 0 | Status: SHIPPED | OUTPATIENT
Start: 2019-08-30 | End: 2019-09-09

## 2019-08-30 NOTE — PATIENT INSTRUCTIONS
Clindamycin 3 times a day for 10 days   Continue to use Aleve for pain   Follow up with a dentist for further evaluation/management     Toothache   WHAT YOU NEED TO KNOW:   A toothache is pain that is caused by irritation of the nerves in the center of your tooth  The irritation may be caused by several problems, such as a cavity, an infection, a cracked tooth, or gum disease  It is very important to follow up with your dentist so the cause of your toothache can be diagnosed and treated  This can help prevent more serious problems  DISCHARGE INSTRUCTIONS:   Medicines: You may  need any of the following:  · NSAIDs  decrease swelling and pain  This medicine can be bought with or without a doctor's order  This medicine can cause stomach bleeding or kidney problems in certain people  If you take blood thinner medicine, always ask your healthcare provider if NSAIDs are safe for you  Always read the medicine label and follow the directions on it before using this medicine  · Acetaminophen  decreases pain  It is available without a doctor's order  Ask how much to take and how often to take it  Follow directions  Acetaminophen can cause liver damage if not taken correctly  · Pain medicine  may be given as a pill or as medicine that you put directly on your tooth or gums  Do not wait until the pain is severe before you take this medicine  · Antibiotics  help fight or prevent an infection caused by bacteria  Take them as directed  · Take your medicine as directed  Contact your healthcare provider if you think your medicine is not helping or if you have side effects  Tell him of her if you are allergic to any medicine  Keep a list of the medicines, vitamins, and herbs you take  Include the amounts, and when and why you take them  Bring the list or the pill bottles to follow-up visits  Carry your medicine list with you in case of an emergency  Follow up with your dentist as directed:   You may be referred to a dental surgeon  Write down your questions so you remember to ask them during your visits  Self-care:   · Rinse your mouth with warm salt water 4 times a day or as directed  · You may need to eat soft foods to help relieve pain caused by chewing  Contact your dentist if:   · You have questions or concerns about your condition or care  Return to the emergency department if:   · You have trouble breathing  · You have swelling in your face or neck  · You have a fever and chills  · You have trouble speaking or swallowing  · You have trouble opening or closing your mouth  © 2017 [facility] Information is for End User's use only and may not be sold, redistributed or otherwise used for commercial purposes  All illustrations and images included in CareNotes® are the copyrighted property of [employer] M , Inc  or [employer]  The above information is an  only  It is not intended as medical advice for individual conditions or treatments  Talk to your doctor, nurse or pharmacist before following any medical regimen to see if it is safe and effective for you

## 2019-08-30 NOTE — PROGRESS NOTES
Assessment/Plan:    No problem-specific Assessment & Plan notes found for this encounter  Diagnoses and all orders for this visit:    Encounter for PPD skin test reading          Subjective:      Patient ID: Inocencia Valdez is a 62 y o  female  HPI    PPD negative     Review of Systems      Objective: There were no vitals taken for this visit           Physical Exam

## 2019-08-30 NOTE — PROGRESS NOTES
[facility]        NAME: Jonelle John is a 62 y o  female  : 1960    MRN: [id number]  DATE: 2019  TIME: 4:42 PM    Assessment and Plan   Pain, dental [K08 89]  1  Pain, dental  clindamycin (CLEOCIN) 300 MG capsule         Patient Instructions   Clindamycin 3 times a day for 10 days   Continue to use Aleve for pain   Follow up with a dentist for further evaluation/management       Follow up with PCP in 3-5 days  Proceed to  ER if symptoms worsen  Chief Complaint     Chief Complaint   Patient presents with    Dental Pain     Pain in L lower jaw in the area of a crown  Started yesterday  Taking Naproxin  History of Present Illness       Patient is a 40-year-old female presenting with a lower dental pain for the past 2 days  She states that pain is surrounding a prior crown  Denies swelling or fever  She took Advil that helped relieve pain  She called her dentist but is unable to get an appointment until next week but was advised to come to urgent care for antibiotics  Review of Systems   Review of Systems   Constitutional: Negative for activity change, chills and fever  HENT: Positive for dental problem  Negative for facial swelling, rhinorrhea and sore throat  Skin: Negative for color change and rash  Neurological: Negative for headaches  Current Medications       Current Outpatient Medications:     mupirocin (BACTROBAN) 2 % ointment, APPLY THREE TIMES DAILY X7 DAYS   THEN REPEAT THE FIRST 7 DAYS OUT OF THE NEXT 3 MONTHS, Disp: , Rfl: 3    ranitidine (ZANTAC) 150 mg tablet, Take 150 mg by mouth 2 (two) times a day, Disp: , Rfl:     sertraline (ZOLOFT) 50 mg tablet, TAKE 1 5 TABLETS BY MOUTH DAILY, Disp: 135 tablet, Rfl: 1    clindamycin (CLEOCIN) 300 MG capsule, Take 1 capsule (300 mg total) by mouth 3 (three) times a day for 10 days, Disp: 30 capsule, Rfl: 0    Current Allergies     Allergies as of 2019    (No Known Allergies) The following portions of the patient's history were reviewed and updated as appropriate: allergies, current medications, past family history, past medical history, past social history, past surgical history and problem list      Past Medical History:   Diagnosis Date    Anxiety     Depression     Glaucoma        Past Surgical History:   Procedure Laterality Date    TOOTH EXTRACTION         Family History   Problem Relation Age of Onset    COPD Mother     Other Mother         tobacco use    Cancer Father 79        bladder    Diabetes Father     Skin cancer Father     Cancer Sister 76        bladder    Breast cancer additional onset Sister 77         Medications have been verified  Objective   /59 (Patient Position: Sitting)   Pulse 64   Temp 97 5 °F (36 4 °C) (Temporal)   Resp 16   Ht 5' 6" (1 676 m)   Wt 95 7 kg (211 lb)   BMI 34 06 kg/m²        Physical Exam     Physical Exam   Constitutional: She is oriented to person, place, and time  Vital signs are normal  She appears well-developed and well-nourished  She is active  No distress  HENT:   Head: Normocephalic  Mouth/Throat: Oropharynx is clear and moist    Erythema on lower left gums  No swelling or obvious dental abscess  Cardiovascular: Normal rate  Pulmonary/Chest: Effort normal    Neurological: She is alert and oriented to person, place, and time  She is not disoriented  Skin: Skin is warm, dry and intact

## 2019-11-13 ENCOUNTER — APPOINTMENT (OUTPATIENT)
Dept: LAB | Facility: CLINIC | Age: 59
End: 2019-11-13
Payer: COMMERCIAL

## 2019-11-13 DIAGNOSIS — E78.2 MIXED HYPERLIPIDEMIA: ICD-10-CM

## 2019-11-13 DIAGNOSIS — Z11.59 ENCOUNTER FOR HEPATITIS C SCREENING TEST FOR LOW RISK PATIENT: ICD-10-CM

## 2019-11-13 LAB
ALBUMIN SERPL BCP-MCNC: 3.7 G/DL (ref 3.5–5)
ALP SERPL-CCNC: 58 U/L (ref 46–116)
ALT SERPL W P-5'-P-CCNC: 28 U/L (ref 12–78)
ANION GAP SERPL CALCULATED.3IONS-SCNC: 7 MMOL/L (ref 4–13)
AST SERPL W P-5'-P-CCNC: 14 U/L (ref 5–45)
BILIRUB SERPL-MCNC: 0.71 MG/DL (ref 0.2–1)
BUN SERPL-MCNC: 12 MG/DL (ref 5–25)
CALCIUM SERPL-MCNC: 9.7 MG/DL (ref 8.3–10.1)
CHLORIDE SERPL-SCNC: 106 MMOL/L (ref 100–108)
CHOLEST SERPL-MCNC: 217 MG/DL (ref 50–200)
CO2 SERPL-SCNC: 29 MMOL/L (ref 21–32)
CREAT SERPL-MCNC: 0.82 MG/DL (ref 0.6–1.3)
ERYTHROCYTE [DISTWIDTH] IN BLOOD BY AUTOMATED COUNT: 12.5 % (ref 11.6–15.1)
GFR SERPL CREATININE-BSD FRML MDRD: 77 ML/MIN/1.73SQ M
GLUCOSE P FAST SERPL-MCNC: 91 MG/DL (ref 65–99)
HCT VFR BLD AUTO: 44.2 % (ref 34.8–46.1)
HCV AB SER QL: NORMAL
HDLC SERPL-MCNC: 54 MG/DL
HGB BLD-MCNC: 14.2 G/DL (ref 11.5–15.4)
LDLC SERPL CALC-MCNC: 148 MG/DL (ref 0–100)
MCH RBC QN AUTO: 30.6 PG (ref 26.8–34.3)
MCHC RBC AUTO-ENTMCNC: 32.1 G/DL (ref 31.4–37.4)
MCV RBC AUTO: 95 FL (ref 82–98)
PLATELET # BLD AUTO: 229 THOUSANDS/UL (ref 149–390)
PMV BLD AUTO: 12.3 FL (ref 8.9–12.7)
POTASSIUM SERPL-SCNC: 4.3 MMOL/L (ref 3.5–5.3)
PROT SERPL-MCNC: 7.6 G/DL (ref 6.4–8.2)
RBC # BLD AUTO: 4.64 MILLION/UL (ref 3.81–5.12)
SODIUM SERPL-SCNC: 142 MMOL/L (ref 136–145)
TRIGL SERPL-MCNC: 77 MG/DL
TSH SERPL DL<=0.05 MIU/L-ACNC: 2.81 UIU/ML (ref 0.36–3.74)
WBC # BLD AUTO: 3.99 THOUSAND/UL (ref 4.31–10.16)

## 2019-11-13 PROCEDURE — 80061 LIPID PANEL: CPT

## 2019-11-13 PROCEDURE — 86803 HEPATITIS C AB TEST: CPT

## 2019-11-13 PROCEDURE — 80053 COMPREHEN METABOLIC PANEL: CPT

## 2019-11-13 PROCEDURE — 36415 COLL VENOUS BLD VENIPUNCTURE: CPT

## 2019-11-13 PROCEDURE — 84443 ASSAY THYROID STIM HORMONE: CPT

## 2019-11-13 PROCEDURE — 85027 COMPLETE CBC AUTOMATED: CPT

## 2019-11-18 ENCOUNTER — OFFICE VISIT (OUTPATIENT)
Dept: FAMILY MEDICINE CLINIC | Facility: CLINIC | Age: 59
End: 2019-11-18
Payer: COMMERCIAL

## 2019-11-18 VITALS
BODY MASS INDEX: 32.3 KG/M2 | TEMPERATURE: 97.3 F | WEIGHT: 201 LBS | DIASTOLIC BLOOD PRESSURE: 80 MMHG | HEART RATE: 79 BPM | RESPIRATION RATE: 16 BRPM | SYSTOLIC BLOOD PRESSURE: 104 MMHG | OXYGEN SATURATION: 97 % | HEIGHT: 66 IN

## 2019-11-18 DIAGNOSIS — Z11.4 SCREENING FOR HIV (HUMAN IMMUNODEFICIENCY VIRUS): ICD-10-CM

## 2019-11-18 DIAGNOSIS — F33.42 RECURRENT MAJOR DEPRESSIVE DISORDER, IN FULL REMISSION (HCC): ICD-10-CM

## 2019-11-18 DIAGNOSIS — Z23 NEED FOR VACCINATION: ICD-10-CM

## 2019-11-18 DIAGNOSIS — E78.2 MIXED HYPERLIPIDEMIA: Primary | ICD-10-CM

## 2019-11-18 DIAGNOSIS — E66.9 OBESITY, CLASS I, BMI 30-34.9: ICD-10-CM

## 2019-11-18 DIAGNOSIS — E55.9 VITAMIN D DEFICIENCY: ICD-10-CM

## 2019-11-18 PROCEDURE — 99214 OFFICE O/P EST MOD 30 MIN: CPT | Performed by: FAMILY MEDICINE

## 2019-11-18 PROCEDURE — 90682 RIV4 VACC RECOMBINANT DNA IM: CPT

## 2019-11-18 PROCEDURE — 90471 IMMUNIZATION ADMIN: CPT

## 2019-11-18 PROCEDURE — 1036F TOBACCO NON-USER: CPT | Performed by: FAMILY MEDICINE

## 2019-11-18 RX ORDER — MELATONIN
2000 DAILY
Qty: 60 TABLET | Refills: 0
Start: 2019-11-18

## 2019-11-18 NOTE — PROGRESS NOTES
Assessment/Plan:    1  Recurrent major depressive disorder, in full remission ([facility] [address] )  Assessment & Plan:  Off zoloft  Having good days and bad days    Orders:  -     CBC; Future; Expected date: 05/01/2020    2  Vitamin D deficiency  Assessment & Plan:  recocommend vitamin 2000iu    Orders:  -     cholecalciferol (VITAMIN D3) 1,000 units tablet; Take 2 tablets (2,000 Units total) by mouth daily  -     TSH, 3rd generation with Free T4 reflex; Future; Expected date: 05/01/2020    3  Obesity, Class I, BMI 30-34 9  Assessment & Plan: On nutrasystem      4  Mixed hyperlipidemia  -     CBC; Future; Expected date: 05/01/2020  -     Comprehensive metabolic panel; Future; Expected date: 05/01/2020  -     Lipid Panel with Direct LDL reflex; Future; Expected date: 05/01/2020  -     TSH, 3rd generation with Free T4 reflex; Future; Expected date: 05/01/2020    5  Screening for HIV (human immunodeficiency virus)  -     HIV 1/2 AG-AB combo; Future; Expected date: 05/01/2020    6  Need for vaccination  -     influenza vaccine, 6552-1641, quadrivalent, recombinant, PF, 0 5 mL, for patients 18 yr+ (FLUBLOK)      BMI Counseling: Body mass index is 32 44 kg/m²  Discussed the patient's BMI with her  The BMI is above normal  Nutrition recommendations include reducing portion sizes and 3-5 servings of fruits/vegetables daily  Exercise recommendations include exercising 3-5 times per week  There are no Patient Instructions on file for this visit  No follow-ups on file  Subjective:      Patient ID: Mariama Skinner is a 62 y o  female      Chief Complaint   Patient presents with    Follow-up     Patient here for 6 month follow up on depressioon        Here for follow up  Taking care of sister- bladder cancer, prior history of breast cancer  On nutrasystem right now  colonoscopy was in 2012- Dr Jim Sagastume      The following portions of the patient's history were reviewed and updated as appropriate: allergies, current medications, past family history, past medical history, past social history, past surgical history and problem list     Review of Systems   Constitutional: Negative  HENT: Negative  Eyes: Negative  Respiratory: Negative  Cardiovascular: Negative  Gastrointestinal: Negative  Endocrine: Negative  Genitourinary: Negative  Musculoskeletal: Negative  Skin: Negative  Allergic/Immunologic: Negative  Neurological: Negative  Hematological: Negative  Psychiatric/Behavioral: Negative  Current Outpatient Medications   Medication Sig Dispense Refill    mupirocin (BACTROBAN) 2 % ointment APPLY THREE TIMES DAILY X7 DAYS  THEN REPEAT THE FIRST 7 DAYS OUT OF THE NEXT 3 MONTHS  3    cholecalciferol (VITAMIN D3) 1,000 units tablet Take 2 tablets (2,000 Units total) by mouth daily 60 tablet 0    ranitidine (ZANTAC) 150 mg tablet Take 150 mg by mouth 2 (two) times a day       No current facility-administered medications for this visit  Objective:    /80   Pulse 79   Temp (!) 97 3 °F (36 3 °C)   Resp 16   Ht 5' 6" (1 676 m)   Wt 91 2 kg (201 lb)   SpO2 97%   BMI 32 44 kg/m²        Physical Exam   Constitutional: She appears well-developed and well-nourished  HENT:   Head: Normocephalic and atraumatic  Eyes: Pupils are equal, round, and reactive to light  EOM are normal    Neck: Normal range of motion  Neck supple  Cardiovascular: Normal rate, regular rhythm, normal heart sounds and intact distal pulses  Pulmonary/Chest: Effort normal and breath sounds normal    Abdominal: Soft  Bowel sounds are normal    Musculoskeletal: Normal range of motion  Neurological: She is alert  Skin: Skin is warm  Capillary refill takes less than 2 seconds  Psychiatric: She has a normal mood and affect  Her behavior is normal  Judgment and thought content normal    Nursing note and vitals reviewed               Yuliya Bolanos DO

## 2019-12-23 PROCEDURE — 87077 CULTURE AEROBIC IDENTIFY: CPT | Performed by: PHYSICIAN ASSISTANT

## 2019-12-23 PROCEDURE — 87070 CULTURE OTHR SPECIMN AEROBIC: CPT | Performed by: PHYSICIAN ASSISTANT

## 2019-12-23 PROCEDURE — 87186 SC STD MICRODIL/AGAR DIL: CPT | Performed by: PHYSICIAN ASSISTANT

## 2019-12-23 PROCEDURE — 87205 SMEAR GRAM STAIN: CPT | Performed by: PHYSICIAN ASSISTANT

## 2019-12-23 PROCEDURE — 87147 CULTURE TYPE IMMUNOLOGIC: CPT | Performed by: PHYSICIAN ASSISTANT

## 2019-12-24 ENCOUNTER — LAB REQUISITION (OUTPATIENT)
Dept: LAB | Facility: HOSPITAL | Age: 59
End: 2019-12-24
Payer: COMMERCIAL

## 2019-12-24 DIAGNOSIS — B95.8 UNSPECIFIED STAPHYLOCOCCUS AS THE CAUSE OF DISEASES CLASSIFIED ELSEWHERE: ICD-10-CM

## 2019-12-27 LAB
BACTERIA WND AEROBE CULT: ABNORMAL
GRAM STN SPEC: ABNORMAL
GRAM STN SPEC: ABNORMAL

## 2020-01-08 ENCOUNTER — TRANSCRIBE ORDERS (OUTPATIENT)
Dept: ADMINISTRATIVE | Facility: HOSPITAL | Age: 60
End: 2020-01-08

## 2020-01-08 DIAGNOSIS — Z12.31 ENCOUNTER FOR SCREENING MAMMOGRAM FOR MALIGNANT NEOPLASM OF BREAST: Primary | ICD-10-CM

## 2020-01-21 ENCOUNTER — TELEPHONE (OUTPATIENT)
Dept: FAMILY MEDICINE CLINIC | Facility: CLINIC | Age: 60
End: 2020-01-21

## 2020-01-21 DIAGNOSIS — F32.A DEPRESSION, UNSPECIFIED DEPRESSION TYPE: ICD-10-CM

## 2020-01-21 NOTE — TELEPHONE ENCOUNTER
Patient called the script line, would like to go back on  sertraline (ZOLOFT) 50 mg tablet [[id number]]  DISCONTINUED     Order Details   Dose, Route, Frequency: As Directed    Dispense Quantity: 135 tablet Refills: 1 Fills remaining: --           Sig: TAKE 1 5 TABLETS BY MOUTH DAILY        To [facility] in [facility]  Please advise

## 2020-02-11 ENCOUNTER — HOSPITAL ENCOUNTER (OUTPATIENT)
Dept: RADIOLOGY | Facility: HOSPITAL | Age: 60
Discharge: HOME | End: 2020-02-11
Payer: COMMERCIAL

## 2020-02-11 VITALS — HEIGHT: 66 IN | BODY MASS INDEX: 32.3 KG/M2 | WEIGHT: 201 LBS

## 2020-02-11 DIAGNOSIS — Z12.31 ENCOUNTER FOR SCREENING MAMMOGRAM FOR MALIGNANT NEOPLASM OF BREAST: ICD-10-CM

## 2020-02-11 PROCEDURE — 77067 SCR MAMMO BI INCL CAD: CPT

## 2020-03-02 ENCOUNTER — OFFICE VISIT (OUTPATIENT)
Dept: FAMILY MEDICINE CLINIC | Facility: CLINIC | Age: 60
End: 2020-03-02
Payer: COMMERCIAL

## 2020-03-02 VITALS
SYSTOLIC BLOOD PRESSURE: 110 MMHG | DIASTOLIC BLOOD PRESSURE: 80 MMHG | TEMPERATURE: 97.4 F | OXYGEN SATURATION: 98 % | RESPIRATION RATE: 13 BRPM | HEART RATE: 78 BPM | WEIGHT: 195.6 LBS | BODY MASS INDEX: 31.43 KG/M2 | HEIGHT: 66 IN

## 2020-03-02 DIAGNOSIS — E78.2 MIXED HYPERLIPIDEMIA: ICD-10-CM

## 2020-03-02 DIAGNOSIS — E66.9 OBESITY, CLASS I, BMI 30-34.9: ICD-10-CM

## 2020-03-02 DIAGNOSIS — F33.42 RECURRENT MAJOR DEPRESSIVE DISORDER, IN FULL REMISSION (HCC): Primary | ICD-10-CM

## 2020-03-02 PROCEDURE — 1036F TOBACCO NON-USER: CPT | Performed by: FAMILY MEDICINE

## 2020-03-02 PROCEDURE — 99214 OFFICE O/P EST MOD 30 MIN: CPT | Performed by: FAMILY MEDICINE

## 2020-03-02 PROCEDURE — 3008F BODY MASS INDEX DOCD: CPT | Performed by: FAMILY MEDICINE

## 2020-03-02 NOTE — PROGRESS NOTES
Assessment/Plan:    1  Recurrent major depressive disorder, in full remission [facility])  Assessment & Plan:  Improved on zoloft  Will continue same dose      2  Obesity, Class I, BMI 30-34 9  Assessment & Plan: On nutrasystem      3  Mixed hyperlipidemia  Assessment & Plan: Will check in may lipids      BMI Counseling: Body mass index is 31 57 kg/m²  The BMI is above normal  Nutrition recommendations include encouraging healthy choices of fruits and vegetables  Exercise recommendations include exercising 3-5 times per week  No pharmacotherapy was ordered  There are no Patient Instructions on file for this visit  No follow-ups on file  Subjective:      Patient ID: Branden Belcher is a 62 y o  female  Chief Complaint   Patient presents with    Follow-up     Patient here for follow up on Depression        Here for follow up  Daughter moved to [place]- daughter having anxiety issues  Going back to [facility] to see her in march  Restarted zoloft-broken heart      The following portions of the patient's history were reviewed and updated as appropriate: allergies, current medications, past family history, past medical history, past social history, past surgical history and problem list     Review of Systems   Constitutional: Negative  HENT: Negative  Eyes: Negative  Respiratory: Negative  Cardiovascular: Negative  Gastrointestinal: Negative  Endocrine: Negative  Genitourinary: Negative  Musculoskeletal: Negative  Allergic/Immunologic: Negative  Neurological: Negative  Hematological: Negative  Psychiatric/Behavioral: Positive for dysphoric mood           Current Outpatient Medications   Medication Sig Dispense Refill    cholecalciferol (VITAMIN D3) 1,000 units tablet Take 2 tablets (2,000 Units total) by mouth daily 60 tablet 0    sertraline (ZOLOFT) 50 mg tablet Take 1 tablet (50 mg total) by mouth daily 90 tablet 1     No current facility-administered medications for this visit  Objective:    /80   Pulse 78   Temp (!) 97 4 °F (36 3 °C)   Resp 13   Ht 5' 6" (1 676 m)   Wt 88 7 kg (195 lb 9 6 oz)   SpO2 98%   BMI 31 57 kg/m²        Physical Exam   Constitutional: She appears well-developed and well-nourished  HENT:   Head: Normocephalic and atraumatic  Eyes: Pupils are equal, round, and reactive to light  Neck: Normal range of motion  Neck supple  Cardiovascular: Normal rate, regular rhythm, normal heart sounds and intact distal pulses  Pulmonary/Chest: Effort normal and breath sounds normal    Abdominal: Soft  Bowel sounds are normal    Musculoskeletal: Normal range of motion  Neurological: She is alert  Skin: Skin is warm  Capillary refill takes less than 2 seconds  Psychiatric: She has a normal mood and affect  Her behavior is normal  Judgment and thought content normal    Nursing note and vitals reviewed               Amanda Carbone DO

## 2020-03-03 ENCOUNTER — TELEPHONE (OUTPATIENT)
Dept: ADMINISTRATIVE | Facility: OTHER | Age: 60
End: 2020-03-03

## 2020-03-03 NOTE — TELEPHONE ENCOUNTER
----- Message from Georgiana Benitez sent at 3/3/2020  8:10 AM EST -----  Regarding: Pap Test  Contact: [phone]  03/03/20 8:10 AM    Hello, our patient Byron Hadrin has had Pap Test completed/performed  Please assist in updating the patient chart by Tim Blackwell [address], [place], [address] [place] [address] [phone] The date of service is most recent      Thank you,  Georgiana Benitez  [facility]

## 2020-03-03 NOTE — LETTER
Procedure Request Form: Cervical Cancer Screening      Date Requested: 20  Patient: Abdon Castro  Patient : 1960   Referring Provider: Noemi Luu, DO        Date of Procedure ___Most recent pap report___________________________       The above patient has informed us that they have completed their   most recent Cervical Cancer Screening at your facility  Please complete   this form and attach all corresponding procedure reports/results  Comments __________________________________________________________  ____________________________________________________________________  ____________________________________________________________________  ____________________________________________________________________    Facility Completing Procedure _________________________________________    Form Completed By (print name) _______________________________________      Signature __________________________________________________________      These reports are needed for  compliance    Please fax this completed form and a copy of the procedure report to our office located at [place] [address] as soon as possible to [phone] sandeep Lott: Phone [phone]    We thank you for your assistance in treating our mutual patient

## 2020-03-03 NOTE — TELEPHONE ENCOUNTER
Upon review of the In Basket request and the patient's chart, initial outreach has been made via fax, please see Contacts section for details  A second outreach attempt will be made within 3 business days      Thank you  Charlette Cerna, [facility] [address] Fax: [phone]

## 2020-03-03 NOTE — LETTER
Procedure Request Form: Cervical Cancer Screening      Date Requested: 20  Patient: Tara Castro  Patient : 1960   Referring Provider: Lizette Desir, DO        2nd Request      Date of Procedure ____Most recent Pap report__________________________       The above patient has informed us that they have completed their   most recent Cervical Cancer Screening at your facility  Please complete   this form and attach all corresponding procedure reports/results  Comments __________________________________________________________  ____________________________________________________________________  ____________________________________________________________________  ____________________________________________________________________    Facility Completing Procedure _________________________________________    Form Completed By (print name) _______________________________________      Signature __________________________________________________________      These reports are needed for  compliance    Please fax this completed form and a copy of the procedure report to our office located at [place] [address] as soon as possible to [phone] sandeep Mortensen: Phone [phone]    We thank you for your assistance in treating our mutual patient

## 2020-03-06 NOTE — TELEPHONE ENCOUNTER
As a follow-up, a second attempt has been made for outreach via fax, please see Contacts section for details  A third and final attempt will be made within 3 business days      Thank you  Charlette Smith, [address] [place] [address] Fax: [phone]

## 2020-03-10 NOTE — TELEPHONE ENCOUNTER
Upon review of the In Basket request we were able to locate, review, and update the patient chart as requested for Pap Smear (HPV) aka Cervical Cancer Screening  Any additional questions or concerns should be emailed to the Practice Liaisons via [email or web address] email, please do not reply via In Basket      Thank you  Charlette Fuller

## 2020-05-12 ENCOUNTER — APPOINTMENT (OUTPATIENT)
Dept: LAB | Facility: CLINIC | Age: 60
End: 2020-05-12
Payer: COMMERCIAL

## 2020-05-12 DIAGNOSIS — F33.42 RECURRENT MAJOR DEPRESSIVE DISORDER, IN FULL REMISSION (HCC): ICD-10-CM

## 2020-05-12 DIAGNOSIS — E55.9 VITAMIN D DEFICIENCY: ICD-10-CM

## 2020-05-12 DIAGNOSIS — E78.2 MIXED HYPERLIPIDEMIA: ICD-10-CM

## 2020-05-12 DIAGNOSIS — Z11.4 SCREENING FOR HIV (HUMAN IMMUNODEFICIENCY VIRUS): ICD-10-CM

## 2020-05-12 LAB
ALBUMIN SERPL BCP-MCNC: 3.5 G/DL (ref 3.5–5)
ALP SERPL-CCNC: 71 U/L (ref 46–116)
ALT SERPL W P-5'-P-CCNC: 49 U/L (ref 12–78)
ANION GAP SERPL CALCULATED.3IONS-SCNC: 8 MMOL/L (ref 4–13)
AST SERPL W P-5'-P-CCNC: 23 U/L (ref 5–45)
BILIRUB SERPL-MCNC: 0.51 MG/DL (ref 0.2–1)
BUN SERPL-MCNC: 15 MG/DL (ref 5–25)
CALCIUM SERPL-MCNC: 9.2 MG/DL (ref 8.3–10.1)
CHLORIDE SERPL-SCNC: 100 MMOL/L (ref 100–108)
CHOLEST SERPL-MCNC: 261 MG/DL (ref 50–200)
CO2 SERPL-SCNC: 29 MMOL/L (ref 21–32)
CREAT SERPL-MCNC: 0.81 MG/DL (ref 0.6–1.3)
ERYTHROCYTE [DISTWIDTH] IN BLOOD BY AUTOMATED COUNT: 13.5 % (ref 11.6–15.1)
GFR SERPL CREATININE-BSD FRML MDRD: 78 ML/MIN/1.73SQ M
GLUCOSE P FAST SERPL-MCNC: 99 MG/DL (ref 65–99)
HCT VFR BLD AUTO: 43.8 % (ref 34.8–46.1)
HDLC SERPL-MCNC: 63 MG/DL
HGB BLD-MCNC: 14.3 G/DL (ref 11.5–15.4)
LDLC SERPL CALC-MCNC: 170 MG/DL (ref 0–100)
MCH RBC QN AUTO: 30.8 PG (ref 26.8–34.3)
MCHC RBC AUTO-ENTMCNC: 32.6 G/DL (ref 31.4–37.4)
MCV RBC AUTO: 94 FL (ref 82–98)
PLATELET # BLD AUTO: 251 THOUSANDS/UL (ref 149–390)
PMV BLD AUTO: 10.8 FL (ref 8.9–12.7)
POTASSIUM SERPL-SCNC: 4.3 MMOL/L (ref 3.5–5.3)
PROT SERPL-MCNC: 7.7 G/DL (ref 6.4–8.2)
RBC # BLD AUTO: 4.64 MILLION/UL (ref 3.81–5.12)
SODIUM SERPL-SCNC: 137 MMOL/L (ref 136–145)
TRIGL SERPL-MCNC: 138 MG/DL
TSH SERPL DL<=0.05 MIU/L-ACNC: 3.01 UIU/ML (ref 0.36–3.74)
WBC # BLD AUTO: 4.32 THOUSAND/UL (ref 4.31–10.16)

## 2020-05-12 PROCEDURE — 36415 COLL VENOUS BLD VENIPUNCTURE: CPT

## 2020-05-12 PROCEDURE — 80061 LIPID PANEL: CPT

## 2020-05-12 PROCEDURE — 80053 COMPREHEN METABOLIC PANEL: CPT

## 2020-05-12 PROCEDURE — 87389 HIV-1 AG W/HIV-1&-2 AB AG IA: CPT

## 2020-05-12 PROCEDURE — 85027 COMPLETE CBC AUTOMATED: CPT

## 2020-05-12 PROCEDURE — 84443 ASSAY THYROID STIM HORMONE: CPT

## 2020-05-13 LAB — HIV 1+2 AB+HIV1 P24 AG SERPL QL IA: NORMAL

## 2020-05-14 ENCOUNTER — OFFICE VISIT (OUTPATIENT)
Dept: FAMILY MEDICINE CLINIC | Facility: CLINIC | Age: 60
End: 2020-05-14
Payer: COMMERCIAL

## 2020-05-14 VITALS
RESPIRATION RATE: 14 BRPM | WEIGHT: 202.6 LBS | HEIGHT: 65 IN | BODY MASS INDEX: 33.76 KG/M2 | HEART RATE: 76 BPM | TEMPERATURE: 97.5 F | OXYGEN SATURATION: 98 % | DIASTOLIC BLOOD PRESSURE: 84 MMHG | SYSTOLIC BLOOD PRESSURE: 124 MMHG

## 2020-05-14 DIAGNOSIS — E78.2 MIXED HYPERLIPIDEMIA: ICD-10-CM

## 2020-05-14 DIAGNOSIS — E66.9 OBESITY, CLASS I, BMI 30-34.9: ICD-10-CM

## 2020-05-14 DIAGNOSIS — Z00.00 ANNUAL PHYSICAL EXAM: Primary | ICD-10-CM

## 2020-05-14 PROCEDURE — 3008F BODY MASS INDEX DOCD: CPT | Performed by: FAMILY MEDICINE

## 2020-05-14 PROCEDURE — 99396 PREV VISIT EST AGE 40-64: CPT | Performed by: FAMILY MEDICINE

## 2020-05-14 RX ORDER — ATORVASTATIN CALCIUM 10 MG/1
10 TABLET, FILM COATED ORAL DAILY
Qty: 30 TABLET | Refills: 5 | Status: SHIPPED | OUTPATIENT
Start: 2020-05-14 | End: 2020-11-09

## 2020-05-14 RX ORDER — MULTIVIT-MIN/IRON FUM/FOLIC AC 7.5 MG-4
1 TABLET ORAL DAILY
COMMUNITY

## 2020-07-15 DIAGNOSIS — F32.A DEPRESSION, UNSPECIFIED DEPRESSION TYPE: ICD-10-CM

## 2020-09-18 ENCOUNTER — APPOINTMENT (OUTPATIENT)
Dept: LAB | Facility: CLINIC | Age: 60
End: 2020-09-18
Payer: COMMERCIAL

## 2020-09-18 DIAGNOSIS — E78.2 MIXED HYPERLIPIDEMIA: ICD-10-CM

## 2020-09-18 LAB
ALBUMIN SERPL BCP-MCNC: 3.6 G/DL (ref 3.5–5)
ALP SERPL-CCNC: 73 U/L (ref 46–116)
ALT SERPL W P-5'-P-CCNC: 47 U/L (ref 12–78)
ANION GAP SERPL CALCULATED.3IONS-SCNC: 8 MMOL/L (ref 4–13)
AST SERPL W P-5'-P-CCNC: 23 U/L (ref 5–45)
BILIRUB SERPL-MCNC: 0.6 MG/DL (ref 0.2–1)
BUN SERPL-MCNC: 11 MG/DL (ref 5–25)
CALCIUM SERPL-MCNC: 9.5 MG/DL (ref 8.3–10.1)
CHLORIDE SERPL-SCNC: 104 MMOL/L (ref 100–108)
CHOLEST SERPL-MCNC: 190 MG/DL (ref 50–200)
CO2 SERPL-SCNC: 28 MMOL/L (ref 21–32)
CREAT SERPL-MCNC: 0.86 MG/DL (ref 0.6–1.3)
GFR SERPL CREATININE-BSD FRML MDRD: 73 ML/MIN/1.73SQ M
GLUCOSE P FAST SERPL-MCNC: 99 MG/DL (ref 65–99)
HDLC SERPL-MCNC: 68 MG/DL
LDLC SERPL CALC-MCNC: 101 MG/DL (ref 0–100)
POTASSIUM SERPL-SCNC: 4 MMOL/L (ref 3.5–5.3)
PROT SERPL-MCNC: 7.6 G/DL (ref 6.4–8.2)
SODIUM SERPL-SCNC: 140 MMOL/L (ref 136–145)
T4 FREE SERPL-MCNC: 0.87 NG/DL (ref 0.76–1.46)
TRIGL SERPL-MCNC: 107 MG/DL
TSH SERPL DL<=0.05 MIU/L-ACNC: 4.15 UIU/ML (ref 0.36–3.74)

## 2020-09-18 PROCEDURE — 84439 ASSAY OF FREE THYROXINE: CPT

## 2020-09-18 PROCEDURE — 80053 COMPREHEN METABOLIC PANEL: CPT

## 2020-09-18 PROCEDURE — 84443 ASSAY THYROID STIM HORMONE: CPT

## 2020-09-18 PROCEDURE — 80061 LIPID PANEL: CPT

## 2020-09-18 PROCEDURE — 36415 COLL VENOUS BLD VENIPUNCTURE: CPT

## 2020-09-22 ENCOUNTER — OFFICE VISIT (OUTPATIENT)
Dept: FAMILY MEDICINE CLINIC | Facility: CLINIC | Age: 60
End: 2020-09-22
Payer: COMMERCIAL

## 2020-09-22 VITALS
WEIGHT: 208 LBS | HEIGHT: 65 IN | SYSTOLIC BLOOD PRESSURE: 100 MMHG | RESPIRATION RATE: 18 BRPM | HEART RATE: 84 BPM | OXYGEN SATURATION: 99 % | BODY MASS INDEX: 34.66 KG/M2 | DIASTOLIC BLOOD PRESSURE: 64 MMHG | TEMPERATURE: 97 F

## 2020-09-22 DIAGNOSIS — F33.42 RECURRENT MAJOR DEPRESSIVE DISORDER, IN FULL REMISSION (HCC): ICD-10-CM

## 2020-09-22 DIAGNOSIS — F41.9 ANXIETY: ICD-10-CM

## 2020-09-22 DIAGNOSIS — E66.9 OBESITY, CLASS I, BMI 30-34.9: ICD-10-CM

## 2020-09-22 DIAGNOSIS — E78.2 MIXED HYPERLIPIDEMIA: Primary | ICD-10-CM

## 2020-09-22 DIAGNOSIS — Z12.11 SCREENING FOR COLON CANCER: ICD-10-CM

## 2020-09-22 DIAGNOSIS — F32.A DEPRESSION, UNSPECIFIED DEPRESSION TYPE: ICD-10-CM

## 2020-09-22 PROCEDURE — 99214 OFFICE O/P EST MOD 30 MIN: CPT | Performed by: FAMILY MEDICINE

## 2020-09-22 RX ORDER — SERTRALINE HYDROCHLORIDE 100 MG/1
100 TABLET, FILM COATED ORAL DAILY
Qty: 90 TABLET | Refills: 3 | Status: SHIPPED | OUTPATIENT
Start: 2020-09-22 | End: 2021-09-13

## 2020-09-22 NOTE — PROGRESS NOTES
Assessment/Plan:    1  Mixed hyperlipidemia  Assessment & Plan:  Much improved with lipitor    Orders:  -     CBC; Future; Expected date: 03/01/2021  -     Comprehensive metabolic panel; Future; Expected date: 03/01/2021  -     Lipid Panel with Direct LDL reflex; Future; Expected date: 03/01/2021  -     TSH, 3rd generation with Free T4 reflex; Future; Expected date: 03/01/2021    2  Anxiety  Assessment & Plan:  Increase zoloft    Orders:  -     TSH, 3rd generation with Free T4 reflex; Future; Expected date: 03/01/2021    3  Depression, unspecified depression type  -     sertraline (ZOLOFT) 100 mg tablet; Take 1 tablet (100 mg total) by mouth daily  -     TSH, 3rd generation with Free T4 reflex; Future; Expected date: 03/01/2021    4  Obesity, Class I, BMI 30-34 9  Assessment & Plan:  Diet and exercise discussed      5  Recurrent major depressive disorder, in full remission [facility])  Assessment & Plan:  Situational depression right now      6  Screening for colon cancer  -     Cologuard; Future          There are no Patient Instructions on file for this visit  No follow-ups on file  Subjective:      Patient ID: Fredy Mendoza is a 56 y o  female  Chief Complaint   Patient presents with    Follow-up     Patient here for 4 month follow up on Hyperlipidemia        Here for follow up  Feeling overwhelmed-sister with bladder cancer fell and broke humerus  Feeling anxious and angry  Taking pepcid in and tums which does relief gerd  Alcohol and caffeine increased  Will return for flu shot    Hyperlipidemia   This is a chronic problem  The current episode started more than 1 year ago  The problem is controlled  Recent lipid tests were reviewed and are normal  There are no known factors aggravating her hyperlipidemia  Current antihyperlipidemic treatment includes statins  The current treatment provides significant improvement of lipids  There are no compliance problems  Anxiety   Presents for follow-up visit  Symptoms include depressed mood, irritability and nervous/anxious behavior  The quality of sleep is fair  Nighttime awakenings: none  Compliance with medications is %  Heartburn   She complains of heartburn  This is a new problem  The problem occurs constantly  The problem has been unchanged  The heartburn duration is several minutes  There are no known risk factors  She has tried an antacid for the symptoms  The treatment provided mild relief  The following portions of the patient's history were reviewed and updated as appropriate: allergies, current medications, past family history, past medical history, past social history, past surgical history and problem list     Review of Systems   Constitutional: Positive for irritability  HENT: Negative  Eyes: Negative  Respiratory: Negative  Cardiovascular: Negative  Gastrointestinal: Positive for heartburn  Endocrine: Negative  Genitourinary: Negative  Musculoskeletal: Negative  Allergic/Immunologic: Negative  Neurological: Negative  Hematological: Negative  Psychiatric/Behavioral: Positive for dysphoric mood  The patient is nervous/anxious  Current Outpatient Medications   Medication Sig Dispense Refill    atorvastatin (LIPITOR) 10 mg tablet Take 1 tablet (10 mg total) by mouth daily 30 tablet 5    cholecalciferol (VITAMIN D3) 1,000 units tablet Take 2 tablets (2,000 Units total) by mouth daily 60 tablet 0    Coenzyme Q10 (CO Q 10 PO) Take by mouth      Multiple Vitamins-Minerals (MULTIVITAMIN WITH MINERALS) tablet Take 1 tablet by mouth daily      Probiotic Product (PROBIOTIC-10 PO) Take by mouth      sertraline (ZOLOFT) 100 mg tablet Take 1 tablet (100 mg total) by mouth daily 90 tablet 3     No current facility-administered medications for this visit          Objective:    /64   Pulse 84   Temp (!) 97 °F (36 1 °C)   Resp 18   Ht 5' 5" (1 651 m)   Wt 94 3 kg (208 lb)   SpO2 99%   BMI 34 61 kg/m²        Physical Exam  Vitals signs and nursing note reviewed  Constitutional:       Appearance: Normal appearance  HENT:      Head: Normocephalic and atraumatic  Eyes:      Extraocular Movements: Extraocular movements intact  Pupils: Pupils are equal, round, and reactive to light  Neck:      Musculoskeletal: Normal range of motion and neck supple  Cardiovascular:      Rate and Rhythm: Normal rate and regular rhythm  Pulses: Normal pulses  Heart sounds: Normal heart sounds  Pulmonary:      Effort: Pulmonary effort is normal       Breath sounds: Normal breath sounds  Abdominal:      General: Abdomen is flat  Palpations: Abdomen is soft  Musculoskeletal: Normal range of motion  Skin:     General: Skin is warm  Capillary Refill: Capillary refill takes less than 2 seconds  Neurological:      General: No focal deficit present  Mental Status: She is alert and oriented to person, place, and time     Psychiatric:         Mood and Affect: Mood normal          Behavior: Behavior normal                 Angeloe Max, DO

## 2020-11-08 DIAGNOSIS — E78.2 MIXED HYPERLIPIDEMIA: ICD-10-CM

## 2020-11-09 ENCOUNTER — TELEPHONE (OUTPATIENT)
Dept: FAMILY MEDICINE CLINIC | Facility: CLINIC | Age: 60
End: 2020-11-09

## 2020-11-09 RX ORDER — ATORVASTATIN CALCIUM 10 MG/1
TABLET, FILM COATED ORAL
Qty: 90 TABLET | Refills: 1 | Status: SHIPPED | OUTPATIENT
Start: 2020-11-09 | End: 2021-05-02

## 2020-11-11 ENCOUNTER — OFFICE VISIT (OUTPATIENT)
Dept: FAMILY MEDICINE CLINIC | Facility: CLINIC | Age: 60
End: 2020-11-11
Payer: COMMERCIAL

## 2020-11-11 VITALS
HEIGHT: 65 IN | SYSTOLIC BLOOD PRESSURE: 110 MMHG | HEART RATE: 98 BPM | TEMPERATURE: 97.6 F | OXYGEN SATURATION: 98 % | DIASTOLIC BLOOD PRESSURE: 80 MMHG | RESPIRATION RATE: 14 BRPM | WEIGHT: 210.8 LBS | BODY MASS INDEX: 35.12 KG/M2

## 2020-11-11 DIAGNOSIS — R20.0 NUMBNESS AND TINGLING OF LEFT HAND: Primary | ICD-10-CM

## 2020-11-11 DIAGNOSIS — R20.2 NUMBNESS AND TINGLING OF LEFT HAND: Primary | ICD-10-CM

## 2020-11-11 DIAGNOSIS — Z23 NEED FOR VACCINATION: ICD-10-CM

## 2020-11-11 PROCEDURE — 99213 OFFICE O/P EST LOW 20 MIN: CPT | Performed by: FAMILY MEDICINE

## 2020-11-11 PROCEDURE — 3725F SCREEN DEPRESSION PERFORMED: CPT | Performed by: FAMILY MEDICINE

## 2020-11-11 PROCEDURE — 90682 RIV4 VACC RECOMBINANT DNA IM: CPT

## 2020-11-11 PROCEDURE — 90471 IMMUNIZATION ADMIN: CPT

## 2020-11-11 RX ORDER — FAMOTIDINE 20 MG/1
20 TABLET, FILM COATED ORAL DAILY
COMMUNITY

## 2020-11-20 ENCOUNTER — CONSULT (OUTPATIENT)
Dept: OBGYN CLINIC | Facility: CLINIC | Age: 60
End: 2020-11-20
Payer: COMMERCIAL

## 2020-11-20 VITALS
WEIGHT: 213.2 LBS | SYSTOLIC BLOOD PRESSURE: 139 MMHG | DIASTOLIC BLOOD PRESSURE: 79 MMHG | BODY MASS INDEX: 36.4 KG/M2 | HEIGHT: 64 IN | TEMPERATURE: 98.1 F | HEART RATE: 91 BPM

## 2020-11-20 DIAGNOSIS — M54.12 CERVICAL RADICULITIS: Primary | ICD-10-CM

## 2020-11-20 DIAGNOSIS — R20.0 NUMBNESS AND TINGLING OF LEFT HAND: ICD-10-CM

## 2020-11-20 DIAGNOSIS — R20.2 NUMBNESS AND TINGLING OF LEFT HAND: ICD-10-CM

## 2020-11-20 PROCEDURE — 99243 OFF/OP CNSLTJ NEW/EST LOW 30: CPT | Performed by: ORTHOPAEDIC SURGERY

## 2020-11-20 PROCEDURE — 3008F BODY MASS INDEX DOCD: CPT | Performed by: ORTHOPAEDIC SURGERY

## 2020-11-20 PROCEDURE — 1036F TOBACCO NON-USER: CPT | Performed by: ORTHOPAEDIC SURGERY

## 2020-11-20 RX ORDER — METHYLPREDNISOLONE 4 MG/1
TABLET ORAL
Qty: 1 EACH | Refills: 0 | Status: SHIPPED | OUTPATIENT
Start: 2020-11-20 | End: 2021-01-15

## 2020-12-14 ENCOUNTER — CONSULT (OUTPATIENT)
Dept: PAIN MEDICINE | Facility: CLINIC | Age: 60
End: 2020-12-14
Payer: COMMERCIAL

## 2020-12-14 VITALS
BODY MASS INDEX: 36.37 KG/M2 | DIASTOLIC BLOOD PRESSURE: 82 MMHG | HEART RATE: 84 BPM | HEIGHT: 64 IN | WEIGHT: 213 LBS | SYSTOLIC BLOOD PRESSURE: 145 MMHG | TEMPERATURE: 98.7 F

## 2020-12-14 DIAGNOSIS — R20.0 NUMBNESS AND TINGLING OF LEFT HAND: ICD-10-CM

## 2020-12-14 DIAGNOSIS — R20.2 NUMBNESS AND TINGLING OF LEFT HAND: ICD-10-CM

## 2020-12-14 DIAGNOSIS — M54.12 CERVICAL RADICULITIS: Primary | ICD-10-CM

## 2020-12-14 PROCEDURE — 99244 OFF/OP CNSLTJ NEW/EST MOD 40: CPT | Performed by: ANESTHESIOLOGY

## 2020-12-14 PROCEDURE — 3008F BODY MASS INDEX DOCD: CPT | Performed by: ANESTHESIOLOGY

## 2020-12-14 PROCEDURE — 1036F TOBACCO NON-USER: CPT | Performed by: ANESTHESIOLOGY

## 2020-12-21 ENCOUNTER — EVALUATION (OUTPATIENT)
Dept: PHYSICAL THERAPY | Facility: REHABILITATION | Age: 60
End: 2020-12-21
Payer: COMMERCIAL

## 2020-12-21 DIAGNOSIS — R20.2 NUMBNESS AND TINGLING OF LEFT HAND: Primary | ICD-10-CM

## 2020-12-21 DIAGNOSIS — M54.12 CERVICAL RADICULITIS: ICD-10-CM

## 2020-12-21 DIAGNOSIS — R20.0 NUMBNESS AND TINGLING OF LEFT HAND: Primary | ICD-10-CM

## 2020-12-21 PROCEDURE — 97161 PT EVAL LOW COMPLEX 20 MIN: CPT | Performed by: PHYSICAL THERAPIST

## 2020-12-21 PROCEDURE — 97110 THERAPEUTIC EXERCISES: CPT | Performed by: PHYSICAL THERAPIST

## 2020-12-29 ENCOUNTER — OFFICE VISIT (OUTPATIENT)
Dept: PHYSICAL THERAPY | Facility: REHABILITATION | Age: 60
End: 2020-12-29
Payer: COMMERCIAL

## 2020-12-29 DIAGNOSIS — R20.0 NUMBNESS AND TINGLING OF LEFT HAND: ICD-10-CM

## 2020-12-29 DIAGNOSIS — M54.12 CERVICAL RADICULITIS: Primary | ICD-10-CM

## 2020-12-29 DIAGNOSIS — R20.2 NUMBNESS AND TINGLING OF LEFT HAND: ICD-10-CM

## 2020-12-29 PROCEDURE — 97140 MANUAL THERAPY 1/> REGIONS: CPT | Performed by: PHYSICAL THERAPIST

## 2020-12-29 PROCEDURE — 97110 THERAPEUTIC EXERCISES: CPT | Performed by: PHYSICAL THERAPIST

## 2020-12-29 PROCEDURE — 97112 NEUROMUSCULAR REEDUCATION: CPT | Performed by: PHYSICAL THERAPIST

## 2020-12-30 ENCOUNTER — OFFICE VISIT (OUTPATIENT)
Dept: PHYSICAL THERAPY | Facility: REHABILITATION | Age: 60
End: 2020-12-30
Payer: COMMERCIAL

## 2020-12-30 DIAGNOSIS — R20.0 NUMBNESS AND TINGLING OF LEFT HAND: ICD-10-CM

## 2020-12-30 DIAGNOSIS — R20.2 NUMBNESS AND TINGLING OF LEFT HAND: ICD-10-CM

## 2020-12-30 DIAGNOSIS — M54.12 CERVICAL RADICULITIS: Primary | ICD-10-CM

## 2020-12-30 PROCEDURE — 97140 MANUAL THERAPY 1/> REGIONS: CPT | Performed by: PHYSICAL THERAPIST

## 2020-12-30 PROCEDURE — 97112 NEUROMUSCULAR REEDUCATION: CPT | Performed by: PHYSICAL THERAPIST

## 2020-12-30 PROCEDURE — 97110 THERAPEUTIC EXERCISES: CPT | Performed by: PHYSICAL THERAPIST

## 2021-01-04 ENCOUNTER — OFFICE VISIT (OUTPATIENT)
Dept: PHYSICAL THERAPY | Facility: REHABILITATION | Age: 61
End: 2021-01-04
Payer: COMMERCIAL

## 2021-01-04 DIAGNOSIS — M54.12 CERVICAL RADICULITIS: Primary | ICD-10-CM

## 2021-01-04 DIAGNOSIS — R20.0 NUMBNESS AND TINGLING OF LEFT HAND: ICD-10-CM

## 2021-01-04 DIAGNOSIS — R20.2 NUMBNESS AND TINGLING OF LEFT HAND: ICD-10-CM

## 2021-01-04 PROCEDURE — 97110 THERAPEUTIC EXERCISES: CPT | Performed by: PHYSICAL THERAPIST

## 2021-01-04 PROCEDURE — 97140 MANUAL THERAPY 1/> REGIONS: CPT | Performed by: PHYSICAL THERAPIST

## 2021-01-04 PROCEDURE — 97112 NEUROMUSCULAR REEDUCATION: CPT | Performed by: PHYSICAL THERAPIST

## 2021-01-04 NOTE — PROGRESS NOTES
Daily Note     Today's date: 2021  Patient name: Saint Caro  : [birth year]  MRN: [id number]  Referring provider: Chris Fan DO  Dx:   Encounter Diagnosis     ICD-10-CM    1  Cervical radiculitis  M54 12    2  Numbness and tingling of left hand  R20 0     R20 2        Start Time: 1102  Stop Time: 1150  Total time in clinic (min): 48 minutes    Subjective: Patient reports no numbness or tingling in the hands but does note some neck stiffness  Objective: See treatment diary below      Assessment: Tolerated treatment well  Patient demonstrated fatigue post treatment, exhibited good technique with therapeutic exercises and would benefit from continued PT  Initiated cervical rotation for improved cervical range of motion with patient requiring maximal cues to maintain proper form as she tends to compensate with trunk rotation  Improved carryover of form noted with majority of TE compared last session  Decreased tightness and TrP noted with left UT STM this visit  Plan: Continue per plan of care        Precautions: Glaucoma, Anxiety, Depression      Manuals          Left UT STM/IASTM as tolerated  8' STM 10' STM 8'          SOR  5'                                     Neuro Re-Ed             Ulnar nerve glides  10 2x10          Median nerve glides                          Self-first rib mobilizations   2x10 flx/abd 2x10 flx/abd          Cervical extension SNAGs* :05x10 :[id number] :[id number] 3x10x5"         Cervical rotation w and wo towel    2x10x5" no towel L         Supine Upper trap stretch* :10x10 Seated :10x10 Seated :10x10 Seated :10x10 ea                      Ther Ex             UBE reverse  20W 5' 20W 5' 20W 5'         Chin tucks*  :05x10 3x10x5" 3x10x5" Sated                       TB low rows  OTB 3x10 OTB 3x10 OTB 3x12         TB mid rows  GTB 3x10 GTB 3x10 GTB 3x12                      Scap retractions w bilateral ER  OTB 2x10 OTB 2x12 OTB 3x10         Prone I   2x10 form 2x10         Prone T   NV 2x10                                   Ther Activity                                       Gait Training                                       Modalities

## 2021-01-06 ENCOUNTER — OFFICE VISIT (OUTPATIENT)
Dept: PHYSICAL THERAPY | Facility: REHABILITATION | Age: 61
End: 2021-01-06
Payer: COMMERCIAL

## 2021-01-06 DIAGNOSIS — R20.2 NUMBNESS AND TINGLING OF LEFT HAND: ICD-10-CM

## 2021-01-06 DIAGNOSIS — M54.12 CERVICAL RADICULITIS: Primary | ICD-10-CM

## 2021-01-06 DIAGNOSIS — R20.0 NUMBNESS AND TINGLING OF LEFT HAND: ICD-10-CM

## 2021-01-06 PROCEDURE — 97140 MANUAL THERAPY 1/> REGIONS: CPT | Performed by: PHYSICAL THERAPIST

## 2021-01-06 PROCEDURE — 97112 NEUROMUSCULAR REEDUCATION: CPT | Performed by: PHYSICAL THERAPIST

## 2021-01-06 PROCEDURE — 97110 THERAPEUTIC EXERCISES: CPT | Performed by: PHYSICAL THERAPIST

## 2021-01-06 NOTE — PROGRESS NOTES
Daily Note     Today's date: 2021  Patient name: Lori Veliz  : [birth year]  MRN: [id number]  Referring provider: Zainab Saavedra DO  Dx:   Encounter Diagnosis     ICD-10-CM    1  Cervical radiculitis  M54 12    2  Numbness and tingling of left hand  R20 0     R20 2        Start Time: 1102  Stop Time: 1200  Total time in clinic (min): 58 minutes    Subjective: Patient reports some stiffness in the neck but feels better from doing the exercises and reports no pain or numbness  Objective: See treatment diary below      Assessment: Tolerated treatment well  Patient demonstrated fatigue post treatment, exhibited good technique with therapeutic exercises and would benefit from continued PT  Able to progress in terms of increased repetitions with postural strengthening patient tolerating well and demonstrating improved carryover of form between visits  Does continue to require cues for proper form with chin tucks and integration of chin tucks into postural strengthening with dynamic movement  May benefit from initiation of mobility exercises targeting thoracic extension next visit  Plan: Continue per plan of care        Precautions: Glaucoma, Anxiety, Depression      Manuals         Left UT STM/IASTM as tolerated  8' STM 10' STM 8'  10'        SOR  5'                                     Neuro Re-Ed             Ulnar nerve glides  10 2x10          Median nerve glides                          Self-first rib mobilizations   2x10 flx/abd 2x10 flx/abd          Cervical extension SNAGs* :05x10 :[id number] :[id number] 3x10x5" 3x10x5"        Cervical rotation wo towel    2x10x5" no towel L 2x10x5" ea        Supine Upper trap stretch* :10x10 Seated :10x10 Seated :10x10 Seated :10x10 ea Seated :10x10 L         Thoracic SNAGs over chair             Thoracic extension stretch 1/2 pillow                          Ther Ex             UBE reverse  20W 5' 20W 5' 20W 5' 20W 5'        Chin tucks* :05x10 3x10x5" 3x10x5" 3x10x5" 3x10x5"                     TB low rows  OTB 3x10 OTB 3x10 OTB 3x12 OTB 3x12        TB mid rows  GTB 3x10 GTB 3x10 GTB 3x12 GTB 3x12 w CT        TB horizontal abduction     OTB 3x10        Scap retractions w bilateral ER  OTB 2x10 OTB 2x12 OTB 3x10 OTB 3x12        Prone I   2x10 form 2x10 3x10        Prone T   NV 2x10 3x10                                  Ther Activity                                       Gait Training                                       Modalities

## 2021-01-11 ENCOUNTER — OFFICE VISIT (OUTPATIENT)
Dept: PHYSICAL THERAPY | Facility: REHABILITATION | Age: 61
End: 2021-01-11
Payer: COMMERCIAL

## 2021-01-11 DIAGNOSIS — M54.12 CERVICAL RADICULITIS: Primary | ICD-10-CM

## 2021-01-11 DIAGNOSIS — R20.0 NUMBNESS AND TINGLING OF LEFT HAND: ICD-10-CM

## 2021-01-11 DIAGNOSIS — R20.2 NUMBNESS AND TINGLING OF LEFT HAND: ICD-10-CM

## 2021-01-11 PROCEDURE — 97110 THERAPEUTIC EXERCISES: CPT | Performed by: PHYSICAL THERAPIST

## 2021-01-11 PROCEDURE — 97112 NEUROMUSCULAR REEDUCATION: CPT | Performed by: PHYSICAL THERAPIST

## 2021-01-11 PROCEDURE — 97140 MANUAL THERAPY 1/> REGIONS: CPT | Performed by: PHYSICAL THERAPIST

## 2021-01-11 NOTE — PROGRESS NOTES
Daily Note     Today's date: 2021  Patient name: Li Maharaj  :   MRN: [id number]  Referring provider: Chyna Chau DO  Dx:   Encounter Diagnosis     ICD-10-CM    1  Cervical radiculitis  M54 12    2  Numbness and tingling of left hand  R20 0     R20 2        Start Time: 1100  Stop Time: 1148  Total time in clinic (min): 48 minutes    Subjective: Patient with no new complaints reporting she has not had numbness or tingling in the hands in a long time  Objective: See treatment diary below      Assessment: Tolerated treatment well  Patient demonstrated fatigue post treatment, exhibited good technique with therapeutic exercises and would benefit from continued PT  Progressed patient in terms of increased repetitions this visit with minimal cues required to maintain proper form  Initiated thoracic extension stretch for additional thoracic mobility and postural awareness with patient reporting positive response  Updated HEP with thoracic stretching  Plan: Continue per plan of care        Precautions: Glaucoma, Anxiety, Depression      Manuals        Left UT STM/IASTM as tolerated  8' STM 10' STM 8'  10' 8'       SOR  5'                                     Neuro Re-Ed             Ulnar nerve glides  10 2x10          Median nerve glides                          Self-first rib mobilizations   2x10 flx/abd 2x10 flx/abd          Cervical extension SNAGs* :05x10 :[id number] :[id number] 3x10x5" 3x10x5" [id number]"       Cervical rotation wo towel    2x10x5" no towel L 2x10x5" ea 2x10x5" ea       Supine Upper trap stretch* :10x10 Seated :10x10 Seated :10x10 Seated :10x10 ea Seated :10x10 L  :10 x10 L       Thoracic SNAGs over chair             Thoracic extension stretch 1/2 pillow      2'                    Ther Ex             UBE reverse  20W 5' 20W 5' 20W 5' 20W 5' 20W 5'        Chin tucks*  :05x10 3x10x5" 3x10x5" 3x10x5" 3x10x5" 3x10x5"                    TB low rows OTB 3x10 OTB 3x10 OTB 3x12 OTB 3x12 OTB 3x15x2"       TB mid rows  GTB 3x10 GTB 3x10 GTB 3x12 GTB 3x12 w CT GTB 3x15       TB horizontal abduction     OTB 3x10        Scap retractions w bilateral ER  OTB 2x10 OTB 2x12 OTB 3x10 OTB 3x12 OTB 3x12       Prone I   2x10 form 2x10 3x10 3x10       Prone T   NV 2x10 3x10 3x10                                 Ther Activity                                       Gait Training                                       Modalities

## 2021-01-13 ENCOUNTER — OFFICE VISIT (OUTPATIENT)
Dept: PHYSICAL THERAPY | Facility: REHABILITATION | Age: 61
End: 2021-01-13
Payer: COMMERCIAL

## 2021-01-13 DIAGNOSIS — R20.2 NUMBNESS AND TINGLING OF LEFT HAND: Primary | ICD-10-CM

## 2021-01-13 DIAGNOSIS — M54.12 CERVICAL RADICULITIS: ICD-10-CM

## 2021-01-13 DIAGNOSIS — R20.0 NUMBNESS AND TINGLING OF LEFT HAND: Primary | ICD-10-CM

## 2021-01-13 PROCEDURE — 97112 NEUROMUSCULAR REEDUCATION: CPT | Performed by: PHYSICAL THERAPIST

## 2021-01-13 PROCEDURE — 97110 THERAPEUTIC EXERCISES: CPT | Performed by: PHYSICAL THERAPIST

## 2021-01-13 PROCEDURE — 97140 MANUAL THERAPY 1/> REGIONS: CPT | Performed by: PHYSICAL THERAPIST

## 2021-01-13 NOTE — PROGRESS NOTES
Daily Note     Today's date: 2021  Patient name: Skyler Paiz  :   MRN: [id number]  Referring provider: Aviav Ramos DO  Dx:   Encounter Diagnosis     ICD-10-CM    1  Numbness and tingling of left hand  R20 0     R20 2    2  Cervical radiculitis  M54 12        Start Time: 1102  Stop Time: 1155  Total time in clinic (min): 53 minutes    Subjective: Patient with no new complaints at start of session  Objective: See treatment diary below      Assessment: Tolerated treatment well  Patient demonstrated fatigue post treatment, exhibited good technique with therapeutic exercises and would benefit from continued PT  Plan: Continue per plan of care        Precautions: Glaucoma, Anxiety, Depression      Manuals       Left UT STM/IASTM as tolerated  8' STM 10' STM 8'  10' 8' 12' b/l      SOR  5'                                     Neuro Re-Ed             Ulnar nerve glides  10 2x10          Median nerve glides                          Self-first rib mobilizations   2x10 flx/abd 2x10 flx/abd          Cervical extension SNAGs* :05x10 :42l2x64 :69y5m31 3x10x5" 3x10x5" 9y71c49" 0i78d89"      Cervical rotation wo towel    2x10x5" no towel L 2x10x5" ea 2x10x5" ea       Supine Upper trap stretch* :10x10 Seated :10x10 Seated :10x10 Seated :10x10 ea Seated :10x10 L  :10 x10 L :10x10 ea      Thoracic SNAGs over chair             Thoracic extension stretch 1/2 pillow      2' 2'      Pinky prone       NV                                             Ther Ex             UBE reverse  20W 5' 20W 5' 20W 5' 20W 5' 20W 5'  25W 5'      Chin tucks*  :05x10 3x10x5" 3x10x5" 3x10x5" 3x10x5" 3x10x5" 3x10x5"                   TB low rows  OTB 3x10 OTB 3x10 OTB 3x12 OTB 3x12 OTB 3x15x2" OTB 3x15      TB mid rows  GTB 3x10 GTB 3x10 GTB 3x12 GTB 3x12 w CT GTB 3x15 GTB 3x15      TB horizontal abduction     OTB 3x10        Scap retractions w bilateral ER  OTB 2x10 OTB 2x12 OTB 3x10 OTB 3x12 OTB 3x12 OTB 3x12      Prone I   2x10 form 2x10 3x10 3x10 1# 3x10      Prone T   NV 2x10 3x10 3x10 1# 3x10                                Ther Activity                                       Gait Training                                       Modalities

## 2021-01-15 ENCOUNTER — OFFICE VISIT (OUTPATIENT)
Dept: URGENT CARE | Facility: CLINIC | Age: 61
End: 2021-01-15
Payer: COMMERCIAL

## 2021-01-15 VITALS
BODY MASS INDEX: 35.49 KG/M2 | HEART RATE: 86 BPM | TEMPERATURE: 97.5 F | DIASTOLIC BLOOD PRESSURE: 63 MMHG | RESPIRATION RATE: 16 BRPM | WEIGHT: 213 LBS | SYSTOLIC BLOOD PRESSURE: 136 MMHG | OXYGEN SATURATION: 96 % | HEIGHT: 65 IN

## 2021-01-15 DIAGNOSIS — R68.84 JAW PAIN: Primary | ICD-10-CM

## 2021-01-15 PROCEDURE — S9083 URGENT CARE CENTER GLOBAL: HCPCS | Performed by: NURSE PRACTITIONER

## 2021-01-15 PROCEDURE — G0382 LEV 3 HOSP TYPE B ED VISIT: HCPCS | Performed by: NURSE PRACTITIONER

## 2021-01-15 NOTE — PROGRESS NOTES
[facility]        NAME: Keith Wheat is a 56 y o  female  : 1960    MRN: [id number]  DATE: January 15, 2021  TIME: 4:54 PM    Assessment and Plan   Jaw pain [R68 84]  1  Jaw pain           Patient Instructions   Heat to the jaw  Follow up with your dentist or PCP if symptoms worsen   Tylenol/Motrin as needed    Follow up with PCP in 3-5 days  Proceed to  ER if symptoms worsen  Chief Complaint     Chief Complaint   Patient presents with    Jaw Pain     x 1 week    Eye Pain     left ear feels different x 1-2 days         History of Present Illness       Patient is a 56year old female presenting with left ear and jaw pain for the past several days  Jaw pain is worse when chewing  She has been attending PT for her neck and recently started to use a different pillow- she believes this to be the cause of her jaw pain  Denies rhinorrhea, sore throat, hearing changes, or drainage from the ear  Denies fever or chills  No OTC medications  Review of Systems   Review of Systems   Constitutional: Negative for activity change, chills and fever  HENT: Positive for ear pain  Negative for congestion, dental problem, ear discharge, rhinorrhea, sinus pressure, sinus pain and sore throat  Skin: Negative for rash  Neurological: Negative for facial asymmetry and headaches           Current Medications       Current Outpatient Medications:     atorvastatin (LIPITOR) 10 mg tablet, TAKE 1 TABLET BY MOUTH EVERY DAY, Disp: 90 tablet, Rfl: 1    cholecalciferol (VITAMIN D3) 1,000 units tablet, Take 2 tablets (2,000 Units total) by mouth daily, Disp: 60 tablet, Rfl: 0    Coenzyme Q10 (CO Q 10 PO), Take by mouth, Disp: , Rfl:     famotidine (PEPCID) 20 mg tablet, Take 20 mg by mouth daily, Disp: , Rfl:     Multiple Vitamins-Minerals (MULTIVITAMIN WITH MINERALS) tablet, Take 1 tablet by mouth daily, Disp: , Rfl:     Probiotic Product (PROBIOTIC-10 PO), Take by mouth, Disp: , Rfl:     sertraline (ZOLOFT) 100 mg tablet, Take 1 tablet (100 mg total) by mouth daily, Disp: 90 tablet, Rfl: 3    Current Allergies     Allergies as of 01/15/2021    (No Known Allergies)            The following portions of the patient's history were reviewed and updated as appropriate: allergies, current medications, past family history, past medical history, past social history, past surgical history and problem list      Past Medical History:   Diagnosis Date    Anxiety     Depression     Glaucoma        Past Surgical History:   Procedure Laterality Date    TOOTH EXTRACTION         Family History   Problem Relation Age of Onset   [facility] COPD Mother     Other Mother         tobacco use    Cancer Mother         bladder cancer    Cancer Father 79        bladder    Diabetes Father     Skin cancer Father     Cancer Sister 76        bladder    Breast cancer Sister 77    No Known Problems Daughter     No Known Problems Maternal Grandmother     No Known Problems Maternal Grandfather     No Known Problems Paternal Grandmother     No Known Problems Paternal Grandfather     No Known Problems Sister     No Known Problems Son     No Known Problems Maternal Aunt     No Known Problems Maternal Aunt          Medications have been verified  Objective   /63   Pulse 86   Temp 97 5 °F (36 4 °C)   Resp 16   Ht 5' 5" (1 651 m)   Wt 96 6 kg (213 lb)   SpO2 96%   BMI 35 45 kg/m²        Physical Exam     Physical Exam  Vitals signs reviewed  Constitutional:       General: She is awake  She is not in acute distress  Appearance: Normal appearance  She is obese  HENT:      Head: Normocephalic  Right Ear: Hearing, tympanic membrane, ear canal and external ear normal       Left Ear: Hearing, tympanic membrane, ear canal and external ear normal       Nose: Nose normal       Mouth/Throat:      Lips: Pink  Pharynx: Oropharynx is clear  Cardiovascular:      Rate and Rhythm: Normal rate     Pulmonary: Effort: Pulmonary effort is normal    Skin:     General: Skin is warm and moist    Neurological:      General: No focal deficit present  Mental Status: She is alert, oriented to person, place, and time and easily aroused  Psychiatric:         Behavior: Behavior is cooperative

## 2021-01-18 ENCOUNTER — OFFICE VISIT (OUTPATIENT)
Dept: PHYSICAL THERAPY | Facility: REHABILITATION | Age: 61
End: 2021-01-18
Payer: COMMERCIAL

## 2021-01-18 DIAGNOSIS — R20.2 NUMBNESS AND TINGLING OF LEFT HAND: Primary | ICD-10-CM

## 2021-01-18 DIAGNOSIS — M54.12 CERVICAL RADICULITIS: ICD-10-CM

## 2021-01-18 DIAGNOSIS — R20.0 NUMBNESS AND TINGLING OF LEFT HAND: Primary | ICD-10-CM

## 2021-01-18 PROCEDURE — 97112 NEUROMUSCULAR REEDUCATION: CPT | Performed by: PHYSICAL THERAPIST

## 2021-01-18 PROCEDURE — 97110 THERAPEUTIC EXERCISES: CPT | Performed by: PHYSICAL THERAPIST

## 2021-01-18 PROCEDURE — 97140 MANUAL THERAPY 1/> REGIONS: CPT | Performed by: PHYSICAL THERAPIST

## 2021-01-18 NOTE — PROGRESS NOTES
Daily Note     Today's date: 2021  Patient name: Keith Wheat  :   MRN: [id number]  Referring provider: Delmy Myrick DO  Dx:   Encounter Diagnosis     ICD-10-CM    1  Numbness and tingling of left hand  R20 0     R20 2    2  Cervical radiculitis  M54 12        Start Time: 1055  Stop Time: 1148  Total time in clinic (min): 53 minutes    Subjective: Patient reports she has not had numbness/tingling in the hands since the first week of PT  Objective: See treatment diary below      Assessment: Tolerated treatment well  Patient demonstrated fatigue post treatment, exhibited good technique with therapeutic exercises and would benefit from continued PT  Decreased bilateral upper trap tightness and trigger points noted with manual therapy this visit  Initiated additional postural strengthening for cervical flexors and postural stability with increased fatigue noted  Minimal cues required for proper form with majority of TE and no complaints of pain throughout  Plan: Continue per plan of care        Precautions: Glaucoma, Anxiety, Depression      Manuals      Left UT STM/IASTM as tolerated  8' STM 10' STM 8'  10' 8' 12' b/l 10' b/l     SOR  5'                                     Neuro Re-Ed             Ulnar nerve glides  10 2x10          Median nerve glides                          Self-first rib mobilizations   2x10 flx/abd 2x10 flx/abd          Cervical extension SNAGs* :05x10 :23j1v22 :96j2u08 3x10x5" 3x10x5" 3j84y65" 7f50n84"      Cervical rotation wo towel    2x10x5" no towel L 2x10x5" ea 2x10x5" ea       Supine Upper trap stretch* :10x10 Seated :10x10 Seated :10x10 Seated :10x10 ea Seated :10x10 L  :10 x10 L :10x10 ea :10x10 ea     Thoracic SNAGs over chair             Thoracic extension stretch 1/2 pillow      2' 2' 3'     Pinky prone       NV :10x10                                            Ther Ex             UBE reverse  20W 5' 20W 5' 20W 5' 20W 5' 20W 5'  25W 5' 25W 5'     Chin tucks*  :05x10 3x10x5" 3x10x5" 3x10x5" 3x10x5" 3x10x5" 3x10x5" 2x10x8"     DNF        2x10     TB low rows  OTB 3x10 OTB 3x10 OTB 3x12 OTB 3x12 OTB 3x15x2" OTB 3x15 GTB 3x12     TB mid rows  GTB 3x10 GTB 3x10 GTB 3x12 GTB 3x12 w CT GTB 3x15 GTB 3x15 BTB 3x12     TB horizontal abduction     OTB 3x10   OTB 2x10     Scap retractions w bilateral ER  OTB 2x10 OTB 2x12 OTB 3x10 OTB 3x12 OTB 3x12 OTB 3x12 GTB 3x12     Prone I   2x10 form 2x10 3x10 3x10 1# 3x10 1# 3x12     Prone T   NV 2x10 3x10 3x10 1# 3x10 1# 3x12                               Ther Activity                                       Gait Training                                       Modalities

## 2021-01-20 ENCOUNTER — OFFICE VISIT (OUTPATIENT)
Dept: PHYSICAL THERAPY | Facility: REHABILITATION | Age: 61
End: 2021-01-20
Payer: COMMERCIAL

## 2021-01-20 DIAGNOSIS — R20.2 NUMBNESS AND TINGLING OF LEFT HAND: Primary | ICD-10-CM

## 2021-01-20 DIAGNOSIS — M54.12 CERVICAL RADICULITIS: ICD-10-CM

## 2021-01-20 DIAGNOSIS — R20.0 NUMBNESS AND TINGLING OF LEFT HAND: Primary | ICD-10-CM

## 2021-01-20 PROCEDURE — 97110 THERAPEUTIC EXERCISES: CPT | Performed by: PHYSICAL THERAPIST

## 2021-01-20 PROCEDURE — 97112 NEUROMUSCULAR REEDUCATION: CPT | Performed by: PHYSICAL THERAPIST

## 2021-01-20 PROCEDURE — 97140 MANUAL THERAPY 1/> REGIONS: CPT | Performed by: PHYSICAL THERAPIST

## 2021-01-20 NOTE — PROGRESS NOTES
Daily Note     Today's date: 2021  Patient name: Kev Barclay  :   MRN: [id number]  Referring provider: Lucinda Keating DO  Dx:   Encounter Diagnosis     ICD-10-CM    1  Numbness and tingling of left hand  R20 0     R20 2    2  Cervical radiculitis  M54 12        Start Time: 1054  Stop Time: 1150  Total time in clinic (min): 56 minutes    Subjective: Patient with no new complaints at start of session  Objective: See treatment diary below      Assessment: Tolerated treatment well  Patient demonstrated fatigue post treatment, exhibited good technique with therapeutic exercises and would benefit from continued PT  Significant decrease in bilateral upper trap tightness and tenderness with manual therapy  Able to progress all postural strengthening with good tolerance  Plan: Continue per plan of care        Precautions: Glaucoma, Anxiety, Depression      Manuals     Left UT STM/IASTM as tolerated  8' STM 10' STM 8'  10' 8' 12' b/l 10' b/l 10' b/l    SOR  5'                                     Neuro Re-Ed             Ulnar nerve glides  10 2x10          Median nerve glides                          Self-first rib mobilizations   2x10 flx/abd 2x10 flx/abd          Cervical extension SNAGs* :05x10 :95c8s59 :19i3n91 3x10x5" 3x10x5" 4u44r65" 2c89v37"      Cervical rotation wo towel    2x10x5" no towel L 2x10x5" ea 2x10x5" ea       Supine Upper trap stretch* :10x10 Seated :10x10 Seated :10x10 Seated :10x10 ea Seated :10x10 L  :10 x10 L :10x10 ea :10x10 ea :10x10 ea    Thoracic SNAGs over chair             Thoracic extension stretch 1/2 pillow      2' 2' 3' 2'    Pinky prone       NV :10x10 :10x10                                           Ther Ex             UBE reverse  20W 5' 20W 5' 20W 5' 20W 5' 20W 5'  25W 5' 25W 5' 30W 5'    Chin tucks*  :05x10 3x10x5" 3x10x5" 3x10x5" 3x10x5" 3x10x5" 3x10x5" 2x10x8" 3x12x5"    DNF        2x10 2x10x3"    TB low rows OTB 3x10 OTB 3x10 OTB 3x12 OTB 3x12 OTB 3x15x2" OTB 3x15 GTB 3x12 GTB 3x12    TB mid rows  GTB 3x10 GTB 3x10 GTB 3x12 GTB 3x12 w CT GTB 3x15 GTB 3x15 BTB 3x12 BTB 3x12    TB horizontal abduction     OTB 3x10   OTB 2x10 OTB 3x10    Scap retractions w bilateral ER  OTB 2x10 OTB 2x12 OTB 3x10 OTB 3x12 OTB 3x12 OTB 3x12 GTB 3x12 GTB 3x12    Prone I   2x10 form 2x10 3x10 3x10 1# 3x10 1# 3x12 1# 3x12    Prone T   NV 2x10 3x10 3x10 1# 3x10 1# 3x12 1# 3x12                              Ther Activity             Standing scaption w/strap         2# 2x10                 Gait Training                                       Modalities

## 2021-01-25 ENCOUNTER — OFFICE VISIT (OUTPATIENT)
Dept: PHYSICAL THERAPY | Facility: REHABILITATION | Age: 61
End: 2021-01-25
Payer: COMMERCIAL

## 2021-01-25 DIAGNOSIS — M54.12 CERVICAL RADICULITIS: ICD-10-CM

## 2021-01-25 DIAGNOSIS — R20.0 NUMBNESS AND TINGLING OF LEFT HAND: Primary | ICD-10-CM

## 2021-01-25 DIAGNOSIS — R20.2 NUMBNESS AND TINGLING OF LEFT HAND: Primary | ICD-10-CM

## 2021-01-25 PROCEDURE — 97110 THERAPEUTIC EXERCISES: CPT | Performed by: PHYSICAL THERAPIST

## 2021-01-25 PROCEDURE — 97112 NEUROMUSCULAR REEDUCATION: CPT | Performed by: PHYSICAL THERAPIST

## 2021-01-25 PROCEDURE — 97140 MANUAL THERAPY 1/> REGIONS: CPT | Performed by: PHYSICAL THERAPIST

## 2021-01-25 NOTE — PROGRESS NOTES
Daily Note     Today's date: 2021  Patient name: Kayleigh Gimenez  : [birth year]  MRN: [id number]  Referring provider: Jesus Willis DO  Dx:   Encounter Diagnosis     ICD-10-CM    1  Numbness and tingling of left hand  R20 0     R20 2    2  Cervical radiculitis  M54 12        Start Time: 129  Stop Time:   Total time in clinic (min): 50 minutes    Subjective: Patient reports the neck and hands have been feeling good  Objective: See treatment diary below      Assessment: Tolerated treatment well  Patient demonstrated fatigue post treatment, exhibited good technique with therapeutic exercises and would benefit from continued PT  Significant improvements noted with manual therapy with minimal upper trap tightness/tenderness/trigger points with palpation  Good carryover of form noted with all TE this visit and no complaints of pain  Will benefit from progression of TE in terms of increased resistance and repetitions next visit with transition to HEP  Patient is following up with MD on 2021  Plan: Continue per plan of care        Precautions: Glaucoma, Anxiety, Depression      Manuals      Left UT STM/IASTM as tolerated   10' STM 8'  10' 8' 12' b/l 10' b/l 10' b/l 10' b/l   SOR                                       Neuro Re-Ed             Ulnar nerve glides   2x10          Median nerve glides                          Self-first rib mobilizations    2x10 flx/abd          Cervical extension SNAGs*   :08g2l56 3x10x5" 3x10x5" 8z85r37" 1f78i17"      Cervical rotation wo towel    2x10x5" no towel L 2x10x5" ea 2x10x5" ea       Supine Upper trap stretch*   Seated :10x10 Seated :10x10 ea Seated :10x10 L  :10 x10 L :10x10 ea :10x10 ea :10x10 ea :10x10 ea   Thoracic SNAGs over chair             Thoracic extension stretch 1/2 pillow      2' 2' 3' 2'    Pinky prone       NV :10x10 :10x10 2a59k88"                                          Ther Ex             UBE reverse 20W 5' 20W 5' 20W 5' 20W 5'  25W 5' 25W 5' 30W 5' 30W 5'   Chin tucks*    3x10x5" 3x10x5" 3x10x5" 3x10x5" 3x10x5" 2x10x8" 3x12x5" 3x12x5"   DNF        2x10 2x10x3" 2x10x3"   TB low rows   OTB 3x10 OTB 3x12 OTB 3x12 OTB 3x15x2" OTB 3x15 GTB 3x12 GTB 3x12 GTB 3x12   TB mid rows   GTB 3x10 GTB 3x12 GTB 3x12 w CT GTB 3x15 GTB 3x15 BTB 3x12 BTB 3x12 BTB 3x12   TB horizontal abduction     OTB 3x10   OTB 2x10 OTB 3x10 OTB 3x10   Scap retractions w bilateral ER   OTB 2x12 OTB 3x10 OTB 3x12 OTB 3x12 OTB 3x12 GTB 3x12 GTB 3x12    Prone I   2x10 form 2x10 3x10 3x10 1# 3x10 1# 3x12 1# 3x12 1# 3x12   Prone T   NV 2x10 3x10 3x10 1# 3x10 1# 3x12 1# 3x12 1# 3x12                             Ther Activity             Standing scaption w/strap         2# 2x10 2# 2x10                Gait Training                                       Modalities

## 2021-01-27 ENCOUNTER — EVALUATION (OUTPATIENT)
Dept: PHYSICAL THERAPY | Facility: REHABILITATION | Age: 61
End: 2021-01-27
Payer: COMMERCIAL

## 2021-01-27 DIAGNOSIS — M54.12 CERVICAL RADICULITIS: Primary | ICD-10-CM

## 2021-01-27 DIAGNOSIS — R20.0 NUMBNESS AND TINGLING OF LEFT HAND: ICD-10-CM

## 2021-01-27 DIAGNOSIS — R20.2 NUMBNESS AND TINGLING OF LEFT HAND: ICD-10-CM

## 2021-01-27 PROCEDURE — 97110 THERAPEUTIC EXERCISES: CPT | Performed by: PHYSICAL THERAPIST

## 2021-01-27 PROCEDURE — 97112 NEUROMUSCULAR REEDUCATION: CPT | Performed by: PHYSICAL THERAPIST

## 2021-01-27 NOTE — PROGRESS NOTES
PT Re-Evaluation  and PT Discharge    Today's date: 2021  Patient name: Silvana Armijo  : [birth year]  MRN: [id number]  Referring provider: Nae Garza DO  Dx:   Encounter Diagnosis     ICD-10-CM    1  Cervical radiculitis  M54 12    2  Numbness and tingling of left hand  R20 0     R20 2        Start Time:   Stop Time: 1150  Total time in clinic (min): 45 minutes    Assessment  Assessment details: Patel Quevedo has made the following improvements since beginning PT: decreased pain, increased ROM, increased strength, increased postural control and awareness and increased tolerance to activities  Ramnéstor Curet has demonstrated objective improvements in cervical range of motion, bilateral upper extremity strength and postural awareness  Patel Ramirezt has met all functional goals and has returned to sewing without limitation from neck pain or hand numbness  Patel Ramirezt and PT mutually agree to transition to HEP at this time secondary to gains made in PT  she has been given updated HEP with verbalized understanding and compliance  Patel Ramirezt is encouraged to contact PT with any questions or concerns in the future  Impairments: abnormal or restricted ROM, activity intolerance, impaired physical strength, lacks appropriate home exercise program, pain with function and poor posture   Understanding of Dx/Px/POC: good   Prognosis: good    Goals  ST-6 weeks  1  Patient will report 50% decrease in episodes of left hand tingling  (met)  2  Patient will demonstrate 25% improvement in cervical ROM  (met)    LT- weeks  1  Patient will be independent with HEP  (met)  2  Patient will report no episodes of left hand tingling when sewing  (met)      Plan  Plan details: Plan to discharge with comprehensive HEP for continued and maintained gains made in PT        Patient would benefit from: PT eval and skilled physical therapy  Planned modality interventions: cryotherapy, unattended electrical stimulation and TENS  Planned therapy interventions: activity modification, flexibility, home exercise program, therapeutic exercise, therapeutic activities, stretching, strengthening, postural training, patient education, neuromuscular re-education, manual therapy and joint mobilization  Treatment plan discussed with: patient        Subjective Evaluation    History of Present Illness  Mechanism of injury: Maribel Roberson has been seen for a total of 11 visits for OP PT for left hand N/T and neck pain  Patient rates overall improvement since beginning %  Patient's global rating of change is " A very great deal better (7) " Patient reports improvements with left hand N/T with no episodes in the last few weeks  Patient reports improvements in postural awareness with sewing and crafting and feels she is able to sit for a long period of time without limitation  Patient follows up with MD 2021  Positioning: looking down at desk to sew, sitting at dining room table resting arms on table for 15-20 minutes at a time    Occupation: retired teacher  PT goals: "not to be bothered by the tingling and be taught what not to do and what exercises to do"     Dr Cristine Wheat follow up on 2020   Quality of life: good    Pain  Current pain ratin  At best pain ratin  At worst pain ratin  Quality: needle-like  Relieving factors: change in position  Aggravating factors: sitting  Progression: improved    Social Support  Lives with: alone    Employment status: not working  Hand dominance: right      Diagnostic Tests  No diagnostic tests performed  Treatments  Current treatment: physical therapy  Patient Goals  Patient goals for therapy: decreased pain, increased motion, increased strength and return to sport/leisure activities          Objective     Concurrent Complaints  Negative for night pain, disturbed sleep, dizziness, faints, headaches, nausea/motion sickness, tinnitus, trouble swallowing, difficulty breathing, shortness of breath, respiratory pain, visual change, cardiac problem, kidney problem, gallbladder problem, stomach problem, ulcer, appendix problem, spleen problem, pancreas problem, history of cancer, history of trauma and infection    Postural Observations  Seated posture: poor  Standing posture: poor        Palpation   Left   No palpable tenderness to the deltoid and suboccipitals  Tenderness of the upper trapezius  Trigger point to upper trapezius  Right   No palpable tenderness to the deltoid, suboccipitals and upper trapezius  Tenderness   Cervical Spine   No tenderness in the left transverse process and right transverse process  Neurological Testing     Sensation   Cervical/Thoracic   Left   Intact: light touch    Right   Intact: light touch    Active Range of Motion   Cervical/Thoracic Spine       Cervical    Flexion: 60 degrees   Extension: 60 degrees      Left lateral flexion: 40 degrees      Right lateral flexion: 35 degrees      Left Shoulder   External rotation BTH: T2   Internal rotation BTB: T10     Right Shoulder   External rotation BTH: T2   Internal rotation BTB: T10     Joint Play   Joints within functional limits: C2, C3, C4, C5, C6 and C7     Strength/Myotome Testing     Left Shoulder     Planes of Motion   Flexion: 5   Abduction: 5   External rotation at 0°: 5   Internal rotation at 0°: 5     Right Shoulder     Planes of Motion   Flexion: 5   Abduction: 5   External rotation at 0°: 5   Internal rotation at 0°: 5     Left Elbow   Flexion: 5  Extension: 5    Right Elbow   Flexion: 5  Extension: 5    Left Wrist/Hand   Thumb extension: 5    Right Wrist/Hand   Thumb extension: 5    Tests   Cervical   Positive lumbar distraction test   Negative vertical compression  Left   Positive cervical flexion-rotation test     Negative Spurling's Test A  Right   Negative Spurling's Test A  Left Shoulder   Negative ULTT1 and ULTT4  Lumbar   Negative vertical compression       General Comments:      Cervical/Thoracic Comments  RE 1/27/2021:  Minimal tightness to left UT and no recreation of hand numbness with cervical range of motion or manual therapy      Significant tightness and tenderness to left UT with recreation of left hand numbness with TrP palpation  Bilateral lateral flexion recreates left hand numbness  Neuro Exam:     Headaches   Patient reports headaches: No        Flowsheet Rows      Most Recent Value   PT/OT G-Codes   Current Score  96   Projected Score  75               Precautions: Glaucoma, Anxiety, Depression      Manuals 1/27 12/30 1/4 1/6 1/11 1/13 1/18 1/20 1/25   Left UT STM/IASTM as tolerated np  10' STM 8'  10' 8' 12' b/l 10' b/l 10' b/l 10' b/l   SOR                                       Neuro Re-Ed             Ulnar nerve glides   2x10          Median nerve glides                          Self-first rib mobilizations    2x10 flx/abd          Cervical extension SNAGs*   :12w4x91 3x10x5" 3x10x5" 2i85c52" 1c44a31"      Cervical rotation wo towel    2x10x5" no towel L 2x10x5" ea 2x10x5" ea       Supine Upper trap stretch* :10x10 ea  Seated :10x10 Seated :10x10 ea Seated :10x10 L  :10 x10 L :10x10 ea :10x10 ea :10x10 ea :10x10 ea   Thoracic SNAGs over chair             Thoracic extension stretch 1/2 pillow      2' 2' 3' 2'    Pinky prone [id number]"      NV :10x10 :10x10 8p29e71"                                          Ther Ex             UBE reverse 30W 5'   20W 5' 20W 5' 20W 5' 20W 5'  25W 5' 25W 5' 30W 5' 30W 5'   Chin tucks*  3x10x5"  3x10x5" 3x10x5" 3x10x5" 3x10x5" 3x10x5" 2x10x8" 3x12x5" 3x12x5"   DNF 3x10x3"       2x10 2x10x3" 2x10x3"   TB low rows GTB 3x15  OTB 3x10 OTB 3x12 OTB 3x12 OTB 3x15x2" OTB 3x15 GTB 3x12 GTB 3x12 GTB 3x12   TB mid rows BTB 3x15  GTB 3x10 GTB 3x12 GTB 3x12 w CT GTB 3x15 GTB 3x15 BTB 3x12 BTB 3x12 BTB 3x12   TB horizontal abduction OTB 3x12    OTB 3x10   OTB 2x10 OTB 3x10 OTB 3x10   Scap retractions w bilateral ER GTB 3x12  OTB 2x12 OTB 3x10 OTB 3x12 OTB 3x12 OTB 3x12 GTB 3x12 GTB 3x12    Prone I 1# 3x15  2x10 form 2x10 3x10 3x10 1# 3x10 1# 3x12 1# 3x12 1# 3x12   Prone T 1# 3x12  NV 2x10 3x10 3x10 1# 3x10 1# 3x12 1# 3x12 1# 3x12                             Ther Activity             Standing scaption w/strap 2# 3x10        2# 2x10 2# 2x10                Gait Training                                       Modalities

## 2021-01-28 ENCOUNTER — OFFICE VISIT (OUTPATIENT)
Dept: PAIN MEDICINE | Facility: CLINIC | Age: 61
End: 2021-01-28
Payer: COMMERCIAL

## 2021-01-28 VITALS
DIASTOLIC BLOOD PRESSURE: 68 MMHG | HEIGHT: 65 IN | HEART RATE: 74 BPM | BODY MASS INDEX: 35.49 KG/M2 | WEIGHT: 213 LBS | TEMPERATURE: 98.5 F | SYSTOLIC BLOOD PRESSURE: 114 MMHG

## 2021-01-28 DIAGNOSIS — M54.12 CERVICAL RADICULITIS: Primary | ICD-10-CM

## 2021-01-28 PROCEDURE — 1036F TOBACCO NON-USER: CPT | Performed by: NURSE PRACTITIONER

## 2021-01-28 PROCEDURE — 3008F BODY MASS INDEX DOCD: CPT | Performed by: NURSE PRACTITIONER

## 2021-01-28 PROCEDURE — 99213 OFFICE O/P EST LOW 20 MIN: CPT | Performed by: NURSE PRACTITIONER

## 2021-01-28 NOTE — PROGRESS NOTES
Assessment:  1  Cervical radiculitis        Plan: 1  The patient will continue with her home exercise program as taught to her by physical therapy  2  The patient will move forward with EMG as ordered by Orthopedics  3  Should the patient's pain return, she was advised to complete x-ray of the cervical spine and we would then order an MRI of the cervical spine without contrast  4  The patient may continue Tylenol and over-the-counter Aleve p r n   5  The patient will follow up on a p r n  basis at this time  M*Modal software was used to dictate this note  It may contain errors with dictating incorrect words or incorrect spelling  Please contact the provider directly with any questions  History of Present Illness: The patient is a 56 y o  female last seen on 12/14/20 who presents for a follow up office visit in regards to numbness and tingling in the left hand and forearm  The patient denies neck pain or left upper extremity pain, right upper extremity symptoms, bowel or bladder incontinence or balance issues  She does feel that the numbness and tingling increases with certain positions of her neck  She has been participating in physical therapy dedicated to her neck and reports  Complete resolution of her symptoms  She states she also changed her pillow that provides more support to her neck which provided a big improvement of her pain  She has not yet completed cervical x-ray because her pain has resolved  She is scheduled for an EMG on February 1, 2021  The patient currently rates her pain as 0/10 on the numeric pain rating scale  She states her pain is occasional in nature and bothersome the morning  She characterizes the pain as dull aching and tight    I have personally reviewed and/or updated the patient's past medical history, past surgical history, family history, social history, current medications, allergies, and vital signs today         Review of Systems:    Review of Systems      Past Medical History:   Diagnosis Date    Anxiety     Depression     Glaucoma        Past Surgical History:   Procedure Laterality Date    TOOTH EXTRACTION         Family History   Problem Relation Age of Onset    COPD Mother     Other Mother         tobacco use    Cancer Mother         bladder cancer    Cancer Father 79        bladder    Diabetes Father     Skin cancer Father     Cancer Sister 76        bladder    Breast cancer Sister 77    No Known Problems Daughter     No Known Problems Maternal Grandmother     No Known Problems Maternal Grandfather     No Known Problems Paternal Grandmother     No Known Problems Paternal Grandfather     No Known Problems Sister     No Known Problems Son     No Known Problems Maternal Aunt     No Known Problems Maternal Aunt        Social History     Occupational History    Not on file   Tobacco Use    Smoking status: Never Smoker    Smokeless tobacco: Never Used   Substance and Sexual Activity    Alcohol use: Yes     Comment: socially    Drug use: No    Sexual activity: Not on file         Current Outpatient Medications:     atorvastatin (LIPITOR) 10 mg tablet, TAKE 1 TABLET BY MOUTH EVERY DAY, Disp: 90 tablet, Rfl: 1    cholecalciferol (VITAMIN D3) 1,000 units tablet, Take 2 tablets (2,000 Units total) by mouth daily, Disp: 60 tablet, Rfl: 0    Coenzyme Q10 (CO Q 10 PO), Take by mouth, Disp: , Rfl:     famotidine (PEPCID) 20 mg tablet, Take 20 mg by mouth daily, Disp: , Rfl:     Multiple Vitamins-Minerals (MULTIVITAMIN WITH MINERALS) tablet, Take 1 tablet by mouth daily, Disp: , Rfl:     Probiotic Product (PROBIOTIC-10 PO), Take by mouth, Disp: , Rfl:     sertraline (ZOLOFT) 100 mg tablet, Take 1 tablet (100 mg total) by mouth daily, Disp: 90 tablet, Rfl: 3    No Known Allergies    Physical Exam:    /68   Pulse 74   Temp 98 5 °F (36 9 °C)   Ht 5' 5" (1 651 m)   Wt 96 6 kg (213 lb)   BMI 35 45 kg/m² Constitutional:normal, well developed, well nourished, alert, in no distress and non-toxic and no overt pain behavior  Eyes:anicteric  HEENT:grossly intact  Neck:supple, symmetric, trachea midline and no masses   Pulmonary:even and unlabored  Cardiovascular:No edema or pitting edema present  Skin:Normal without rashes or lesions and well hydrated  Psychiatric:Mood and affect appropriate  Neurologic:Cranial Nerves II-XII grossly intact  Musculoskeletal:normal gait  Bilateral cervical paraspinal musculature nontender to palpation  Bilateral upper extremity strength 5/5 in all muscle groups  Sensation intact in C5 through T1 dermatomes bilaterally  Negative Tinel's over the bilateral wrists and cubital tunnels  Negative Correa's reflex bilaterally  Negative Spurling's bilaterally  Imaging  No orders to display         No orders of the defined types were placed in this encounter

## 2021-03-19 ENCOUNTER — TRANSCRIBE ORDERS (OUTPATIENT)
Dept: LAB | Facility: CLINIC | Age: 61
End: 2021-03-19

## 2021-03-19 ENCOUNTER — APPOINTMENT (OUTPATIENT)
Dept: LAB | Facility: CLINIC | Age: 61
End: 2021-03-19
Payer: COMMERCIAL

## 2021-03-19 ENCOUNTER — HOSPITAL ENCOUNTER (OUTPATIENT)
Dept: RADIOLOGY | Facility: HOSPITAL | Age: 61
Discharge: HOME | End: 2021-03-19
Attending: ANESTHESIOLOGY
Payer: COMMERCIAL

## 2021-03-19 DIAGNOSIS — R63.5 ABNORMAL WEIGHT GAIN: ICD-10-CM

## 2021-03-19 DIAGNOSIS — R79.89 HYPOURICEMIA: ICD-10-CM

## 2021-03-19 DIAGNOSIS — Z51.81 MEDICATION MONITORING ENCOUNTER: ICD-10-CM

## 2021-03-19 DIAGNOSIS — F32.A DEPRESSION, UNSPECIFIED DEPRESSION TYPE: ICD-10-CM

## 2021-03-19 DIAGNOSIS — M54.12 CERVICAL RADICULITIS: ICD-10-CM

## 2021-03-19 DIAGNOSIS — E66.9 CLASS 2 OBESITY: ICD-10-CM

## 2021-03-19 DIAGNOSIS — F41.9 ANXIETY: ICD-10-CM

## 2021-03-19 DIAGNOSIS — F33.42 MAJOR DEPRESSIVE DISORDER, RECURRENT EPISODE, IN FULL REMISSION (HCC): ICD-10-CM

## 2021-03-19 DIAGNOSIS — Z83.3 FAMILY HISTORY OF DIABETES MELLITUS: ICD-10-CM

## 2021-03-19 DIAGNOSIS — E55.9 VITAMIN D DEFICIENCY, UNSPECIFIED: ICD-10-CM

## 2021-03-19 DIAGNOSIS — E66.9 CLASS 2 OBESITY: Primary | ICD-10-CM

## 2021-03-19 DIAGNOSIS — E78.2 MIXED HYPERLIPIDEMIA: ICD-10-CM

## 2021-03-19 LAB
25(OH)D3 SERPL-MCNC: 38 NG/ML (ref 30–100)
ALBUMIN SERPL BCP-MCNC: 3.6 G/DL (ref 3.5–5)
ALP SERPL-CCNC: 64 U/L (ref 46–116)
ALT SERPL W P-5'-P-CCNC: 43 U/L (ref 12–78)
ANION GAP SERPL CALCULATED.3IONS-SCNC: 6 MMOL/L (ref 4–13)
AST SERPL W P-5'-P-CCNC: 22 U/L (ref 5–45)
BILIRUB SERPL-MCNC: 0.53 MG/DL (ref 0.2–1)
BUN SERPL-MCNC: 14 MG/DL (ref 5–25)
CALCIUM SERPL-MCNC: 9.6 MG/DL (ref 8.3–10.1)
CHLORIDE SERPL-SCNC: 104 MMOL/L (ref 100–108)
CHOLEST SERPL-MCNC: 182 MG/DL (ref 50–200)
CO2 SERPL-SCNC: 30 MMOL/L (ref 21–32)
CREAT SERPL-MCNC: 0.84 MG/DL (ref 0.6–1.3)
ERYTHROCYTE [DISTWIDTH] IN BLOOD BY AUTOMATED COUNT: 12.9 % (ref 11.6–15.1)
EST. AVERAGE GLUCOSE BLD GHB EST-MCNC: 108 MG/DL
GFR SERPL CREATININE-BSD FRML MDRD: 75 ML/MIN/1.73SQ M
GLUCOSE P FAST SERPL-MCNC: 92 MG/DL (ref 65–99)
HBA1C MFR BLD: 5.4 %
HCT VFR BLD AUTO: 44.6 % (ref 34.8–46.1)
HDLC SERPL-MCNC: 71 MG/DL
HGB BLD-MCNC: 14.2 G/DL (ref 11.5–15.4)
LDLC SERPL CALC-MCNC: 100 MG/DL (ref 0–100)
MCH RBC QN AUTO: 30.3 PG (ref 26.8–34.3)
MCHC RBC AUTO-ENTMCNC: 31.8 G/DL (ref 31.4–37.4)
MCV RBC AUTO: 95 FL (ref 82–98)
PLATELET # BLD AUTO: 248 THOUSANDS/UL (ref 149–390)
PMV BLD AUTO: 11 FL (ref 8.9–12.7)
POTASSIUM SERPL-SCNC: 4 MMOL/L (ref 3.5–5.3)
PROT SERPL-MCNC: 7.8 G/DL (ref 6.4–8.2)
RBC # BLD AUTO: 4.68 MILLION/UL (ref 3.81–5.12)
SODIUM SERPL-SCNC: 140 MMOL/L (ref 136–145)
TRIGL SERPL-MCNC: 55 MG/DL
TSH SERPL DL<=0.05 MIU/L-ACNC: 2.99 UIU/ML (ref 0.36–3.74)
WBC # BLD AUTO: 4.11 THOUSAND/UL (ref 4.31–10.16)

## 2021-03-19 PROCEDURE — 83036 HEMOGLOBIN GLYCOSYLATED A1C: CPT

## 2021-03-19 PROCEDURE — 36415 COLL VENOUS BLD VENIPUNCTURE: CPT

## 2021-03-19 PROCEDURE — 80053 COMPREHEN METABOLIC PANEL: CPT

## 2021-03-19 PROCEDURE — 84443 ASSAY THYROID STIM HORMONE: CPT

## 2021-03-19 PROCEDURE — 82306 VITAMIN D 25 HYDROXY: CPT

## 2021-03-19 PROCEDURE — 72050 X-RAY EXAM NECK SPINE 4/5VWS: CPT

## 2021-03-19 PROCEDURE — 85027 COMPLETE CBC AUTOMATED: CPT

## 2021-03-19 PROCEDURE — 80061 LIPID PANEL: CPT

## 2021-03-22 ENCOUNTER — OFFICE VISIT (OUTPATIENT)
Dept: FAMILY MEDICINE CLINIC | Facility: CLINIC | Age: 61
End: 2021-03-22
Payer: COMMERCIAL

## 2021-03-22 VITALS
TEMPERATURE: 97.6 F | WEIGHT: 210.4 LBS | HEART RATE: 94 BPM | RESPIRATION RATE: 16 BRPM | HEIGHT: 65 IN | DIASTOLIC BLOOD PRESSURE: 70 MMHG | OXYGEN SATURATION: 98 % | SYSTOLIC BLOOD PRESSURE: 110 MMHG | BODY MASS INDEX: 35.06 KG/M2

## 2021-03-22 DIAGNOSIS — Z12.31 ENCOUNTER FOR SCREENING MAMMOGRAM FOR MALIGNANT NEOPLASM OF BREAST: ICD-10-CM

## 2021-03-22 DIAGNOSIS — E78.2 MIXED HYPERLIPIDEMIA: Primary | ICD-10-CM

## 2021-03-22 DIAGNOSIS — M54.12 CERVICAL RADICULITIS: ICD-10-CM

## 2021-03-22 DIAGNOSIS — E66.9 OBESITY, CLASS I, BMI 30-34.9: ICD-10-CM

## 2021-03-22 DIAGNOSIS — F33.42 RECURRENT MAJOR DEPRESSIVE DISORDER, IN FULL REMISSION (HCC): ICD-10-CM

## 2021-03-22 PROCEDURE — 99214 OFFICE O/P EST MOD 30 MIN: CPT | Performed by: FAMILY MEDICINE

## 2021-03-22 PROCEDURE — 1036F TOBACCO NON-USER: CPT | Performed by: FAMILY MEDICINE

## 2021-03-22 PROCEDURE — 3008F BODY MASS INDEX DOCD: CPT | Performed by: FAMILY MEDICINE

## 2021-03-22 PROCEDURE — 3725F SCREEN DEPRESSION PERFORMED: CPT | Performed by: FAMILY MEDICINE

## 2021-03-22 NOTE — PROGRESS NOTES
Assessment/Plan:    1  Mixed hyperlipidemia  Assessment & Plan: On lipitor- stable      2  Obesity, Class I, BMI 30-34 9  Assessment & Plan:  Seeing weight loss program      3  Recurrent major depressive disorder, in full remission [facility])  Assessment & Plan: On zoloft      4  Cervical radiculitis  Assessment & Plan:  Did PT for it  Seeing Dr Livia Barfield      5  Encounter for screening mammogram for malignant neoplasm of breast  -     Mammo screening bilateral w 3d & cad; Future; Expected date: 03/22/2021    BMI Counseling: Body mass index is 35 14 kg/m²  The BMI is above normal  Nutrition recommendations include encouraging healthy choices of fruits and vegetables  Exercise recommendations include exercising 3-5 times per week  There are no Patient Instructions on file for this visit  No follow-ups on file  Subjective:      Patient ID: Diego Garrett is a 56 y o  female  Chief Complaint   Patient presents with    Follow-up     Patient here for 6 month follow up on Hyperlipidemia        Had shingles vaccine  But needs second vaccine  Told her to hold off until get covid vaccine  Seen by gyn- seeing [facility] weight management    Hyperlipidemia  This is a chronic problem  The current episode started more than 1 year ago  The problem is controlled  Recent lipid tests were reviewed and are high  Current antihyperlipidemic treatment includes statins  The current treatment provides significant improvement of lipids  There are no compliance problems  The following portions of the patient's history were reviewed and updated as appropriate: allergies, current medications, past family history, past medical history, past social history, past surgical history and problem list     Review of Systems   Constitutional: Negative  HENT: Negative  Eyes: Negative  Respiratory: Negative  Cardiovascular: Negative  Gastrointestinal: Negative  Endocrine: Negative  Genitourinary: Negative  Musculoskeletal: Positive for neck pain  Skin: Negative  Allergic/Immunologic: Negative  Neurological: Negative  Hematological: Negative  Psychiatric/Behavioral: Negative  Current Outpatient Medications   Medication Sig Dispense Refill    atorvastatin (LIPITOR) 10 mg tablet TAKE 1 TABLET BY MOUTH EVERY DAY 90 tablet 1    cholecalciferol (VITAMIN D3) 1,000 units tablet Take 2 tablets (2,000 Units total) by mouth daily 60 tablet 0    Coenzyme Q10 (CO Q 10 PO) Take by mouth      famotidine (PEPCID) 20 mg tablet Take 20 mg by mouth daily      Inulin-Cholecalciferol 2 5-500 GM-UNIT CHEW Chew      Multiple Vitamins-Minerals (MULTIVITAMIN WITH MINERALS) tablet Take 1 tablet by mouth daily      Probiotic Product (PROBIOTIC-10 PO) Take by mouth      sertraline (ZOLOFT) 100 mg tablet Take 1 tablet (100 mg total) by mouth daily 90 tablet 3     No current facility-administered medications for this visit  Objective:    /70   Pulse 94   Temp 97 6 °F (36 4 °C)   Resp 16   Ht 5' 4 88" (1 648 m)   Wt 95 4 kg (210 lb 6 4 oz)   SpO2 98%   BMI 35 14 kg/m²        Physical Exam  Vitals signs and nursing note reviewed  Constitutional:       Appearance: She is well-developed  HENT:      Head: Normocephalic and atraumatic  Nose: Nose normal    Eyes:      General: Lids are normal       Extraocular Movements: Extraocular movements intact  Conjunctiva/sclera: Conjunctivae normal       Pupils: Pupils are equal, round, and reactive to light  Neck:      Musculoskeletal: Normal range of motion and neck supple  Cardiovascular:      Rate and Rhythm: Normal rate and regular rhythm  Heart sounds: Normal heart sounds, S1 normal and S2 normal    Pulmonary:      Effort: Pulmonary effort is normal       Breath sounds: Normal breath sounds  Abdominal:      General: Bowel sounds are normal       Palpations: Abdomen is soft  Musculoskeletal: Normal range of motion     Skin: General: Skin is warm and dry  Neurological:      Mental Status: She is alert and oriented to person, place, and time  Deep Tendon Reflexes: Reflexes are normal and symmetric  Psychiatric:         Speech: Speech normal          Behavior: Behavior normal          Thought Content:  Thought content normal          Judgment: Judgment normal                 Kayla Cueva DO

## 2021-03-24 ENCOUNTER — TELEPHONE (OUTPATIENT)
Dept: OBGYN CLINIC | Facility: CLINIC | Age: 61
End: 2021-03-24

## 2021-03-24 ENCOUNTER — PROCEDURE VISIT (OUTPATIENT)
Dept: NEUROLOGY | Facility: CLINIC | Age: 61
End: 2021-03-24
Payer: COMMERCIAL

## 2021-03-24 DIAGNOSIS — R20.2 NUMBNESS AND TINGLING OF LEFT HAND: ICD-10-CM

## 2021-03-24 DIAGNOSIS — R20.0 NUMBNESS AND TINGLING OF LEFT HAND: ICD-10-CM

## 2021-03-24 PROCEDURE — 95909 NRV CNDJ TST 5-6 STUDIES: CPT | Performed by: PHYSICAL MEDICINE & REHABILITATION

## 2021-03-24 PROCEDURE — 95886 MUSC TEST DONE W/N TEST COMP: CPT | Performed by: PHYSICAL MEDICINE & REHABILITATION

## 2021-03-24 NOTE — PROGRESS NOTES
EMG 1 Limb     Date/Time 3/24/2021 11:29 AM     Performed by  Penny Medina MD     Authorized by June Powers PA-C      Aline Protocol   Consent: Verbal consent obtained  Risks and benefits: risks, benefits and alternatives were discussed  Consent given by: patient  Patient understanding: patient states understanding of the procedure being performed  Patient consent: the patient's understanding of the procedure matches consent given               EMG LEFT UPPER EXTREMITY  64-year-old right-handed female presents with complaints of left hand numbness and tingling which started a few months ago  Symptoms are not constant  She denies any radicular symptoms  Patient is being evaluated for focal neuropathy  On physical examination, motor strength is 5/5 throughout  Sensations are intact to light touch and pinprick  in the median, radial and ulnar nerve distribution  Motor and sensory conduction studies were performed on the median and ulnar nerves  The distal motor latencies were normal  The motor action potential amplitudes were normal  Motor conduction velocities were normal including conduction velocity of the ulnar nerve across the elbow  The  median and ulnar F waves were normal     The  median  Sensory peak latency was prolonged at 3 7 milliseconds with normal sensory action potential amplitude  The radial and ulnar sensory action potentials were normal     Concentric needle EMG was performed on various distal and proximal muscles of the left upper extremity including APB, FDI, pronator teres, deltoid, biceps, triceps and low cervical paraspinal region  There was no evidence of active denervation in any of the muscles tested  The compound motor unit action potentials were of normal configuration with interference patterns being full or full for effort  IMPRESSION:   There is electrophysiologic evidence of a:    1   Mild median nerve compression neuropathy at the wrist with demyelinative changes, consistent with a diagnosis of carpal tunnel syndrome  2   There is no evidence of a cervical radiculopathy or ulnar neuropathy  Clinical correlation is recommended      VALERIE Stoll

## 2021-03-24 NOTE — TELEPHONE ENCOUNTER
Please call the patient and let her know that she has mild carpal tunnel syndrome in the left hand no evidence of cervical radiculopathy on the EMG    If she is still having numbness and tingling in the left hand recommend that she make a follow-up

## 2021-03-24 NOTE — TELEPHONE ENCOUNTER
Patient has been advised  She stated the doctor who did her EMG recommended she sleep with a  Brace on but she does not have one  She is having numbness and tingling still  Patient has been scheduled for follow up

## 2021-04-08 ENCOUNTER — OFFICE VISIT (OUTPATIENT)
Dept: OBGYN CLINIC | Facility: CLINIC | Age: 61
End: 2021-04-08
Payer: COMMERCIAL

## 2021-04-08 VITALS
DIASTOLIC BLOOD PRESSURE: 70 MMHG | BODY MASS INDEX: 33.32 KG/M2 | HEIGHT: 65 IN | SYSTOLIC BLOOD PRESSURE: 120 MMHG | WEIGHT: 200 LBS | HEART RATE: 80 BPM

## 2021-04-08 DIAGNOSIS — G56.02 CARPAL TUNNEL SYNDROME ON LEFT: Primary | ICD-10-CM

## 2021-04-08 PROCEDURE — 3008F BODY MASS INDEX DOCD: CPT | Performed by: ORTHOPAEDIC SURGERY

## 2021-04-08 PROCEDURE — 1036F TOBACCO NON-USER: CPT | Performed by: ORTHOPAEDIC SURGERY

## 2021-04-08 PROCEDURE — 99213 OFFICE O/P EST LOW 20 MIN: CPT | Performed by: ORTHOPAEDIC SURGERY

## 2021-04-08 NOTE — PROGRESS NOTES
Assessment/Plan:  1  Carpal tunnel syndrome on left  Cock Up Wrist Splint     Patient Active Problem List   Diagnosis    Anxiety    Recurrent major depressive disorder, in full remission ([facility] [address] )    Hyperlipidemia    Uterine prolapse    Vitamin D deficiency    Obesity, Class I, BMI 30-34 9    Well adult exam    Annual physical exam    Numbness and tingling of left hand    Need for vaccination    Cervical radiculitis       Discussion/Summary:    56 y o  female  With left  Hand carpal tunnel syndrome  Carpal tunnel is mild on EMG  Her symptoms are reported as mild as well  We will start with a cock-up wrist brace for use at nighttime and throughout the day as needed  Vitamin B6 was recommended for her  Discuss that if her symptoms persist or progress we could try a cortisone injection for the carpal tunnel a or possibly some hand therapy  Ultimate treatment would be carpal tunnel release would like to avoid surgery at this time and given the mild nature of her carpal tunnel at the moment surgical intervention is not recommended currently  The assessment and plan were formulated by Dr Bishop Lombardi and I assisted in carrying it out  Subjective:   Patient ID: Raya Zazueta is a 56 y o  female   HPI    Patient presents to the office for follow up of  Left hand numbness and tingling she is here for EMG review  Since the last visit, the patient reports  She has intermittent numbness and tingling in the left hand  Symptoms do tend to get worse overnight  She has not tried any treatments yet no braces no therapy no injections  She is still able to perform her activities daily living      Denies any fevers chills  Patient  denies any new injuries to the  Left wrist since the last visit       The following portions of the patient's history were reviewed and updated as appropriate: allergies, current medications, past family history, past social history, past surgical history and problem list     Social History     Socioeconomic History    Marital status:       Spouse name: Not on file    Number of children: Not on file    Years of education: Not on file    Highest education level: Not on file   Occupational History    Not on file   Social Needs    Financial resource strain: Not on file    Food insecurity     Worry: Not on file     Inability: Not on file    Transportation needs     Medical: Not on file     Non-medical: Not on file   Tobacco Use    Smoking status: Never Smoker    Smokeless tobacco: Never Used   Substance and Sexual Activity    Alcohol use: Yes     Comment: socially    Drug use: No    Sexual activity: Not on file   Lifestyle    Physical activity     Days per week: Not on file     Minutes per session: Not on file    Stress: Not on file   Relationships    Social connections     Talks on phone: Not on file     Gets together: Not on file     Attends Anabaptism service: Not on file     Active member of club or organization: Not on file     Attends meetings of clubs or organizations: Not on file     Relationship status: Not on file    Intimate partner violence     Fear of current or ex partner: Not on file     Emotionally abused: Not on file     Physically abused: Not on file     Forced sexual activity: Not on file   Other Topics Concern    Not on file   Social History Narrative    Not on file     Past Medical History:   Diagnosis Date    Anxiety     Depression     Glaucoma      Past Surgical History:   Procedure Laterality Date    TOOTH EXTRACTION       No Known Allergies  Current Outpatient Medications on File Prior to Visit   Medication Sig Dispense Refill    atorvastatin (LIPITOR) 10 mg tablet TAKE 1 TABLET BY MOUTH EVERY DAY 90 tablet 1    cholecalciferol (VITAMIN D3) 1,000 units tablet Take 2 tablets (2,000 Units total) by mouth daily 60 tablet 0    Coenzyme Q10 (CO Q 10 PO) Take by mouth      famotidine (PEPCID) 20 mg tablet Take 20 mg by mouth daily  Inulin-Cholecalciferol 2 5-500 GM-UNIT CHEW Chew      Multiple Vitamins-Minerals (MULTIVITAMIN WITH MINERALS) tablet Take 1 tablet by mouth daily      Probiotic Product (PROBIOTIC-10 PO) Take by mouth      sertraline (ZOLOFT) 100 mg tablet Take 1 tablet (100 mg total) by mouth daily 90 tablet 3     No current facility-administered medications on file prior to visit  Review of Systems   All other systems reviewed and are negative  Objective:    Vitals:    04/08/21 1108   BP: 120/70   Pulse: 80       Physical Exam  Constitutional:       Appearance: She is well-developed  HENT:      Head: Normocephalic and atraumatic  Eyes:      General: No scleral icterus  Conjunctiva/sclera: Conjunctivae normal    Neck:      Musculoskeletal: Neck supple  Cardiovascular:      Comments: No discernible arrhthymias  Pulmonary:      Effort: Pulmonary effort is normal  No respiratory distress  Breath sounds: No stridor  Abdominal:      General: There is no distension  Palpations: Abdomen is soft  Skin:     General: Skin is warm and dry  Findings: No erythema  Neurological:      Mental Status: She is alert and oriented to person, place, and time  Psychiatric:         Behavior: Behavior normal          Right Hand Exam     Tenderness   Right hand tenderness location:  tenderness over long finger PIP joint  Left Hand Exam     Tenderness   The patient is experiencing no tenderness  Range of Motion   The patient has normal left wrist ROM  Tests   Phalens sign: positive  Tinel's sign (median nerve): positive    Other   Erythema: absent  Scars: absent  Pulse: present    Comments:  4/5  Strength APB            I have personally reviewed pertinent films in PACS and my interpretation is  EMG of the left hand was reviewed demonstrates mild carpal tunnel syndrome no cervical or ulnar neuropathy      Procedures  No Procedures performed today    Portions of the record may have been created with voice recognition software  Occasional wrong word or "sound a like" substitutions may have occurred due to the inherent limitations of voice recognition software  Read the chart carefully and recognize, using context, where substitutions have occurred

## 2021-04-08 NOTE — PATIENT INSTRUCTIONS
Vitamin B6 100-200 mg daily can be purchased over the counter and you may try this for your left hand carpal tunnel syndrome

## 2021-04-13 DIAGNOSIS — Z23 ENCOUNTER FOR IMMUNIZATION: ICD-10-CM

## 2021-05-01 DIAGNOSIS — E78.2 MIXED HYPERLIPIDEMIA: ICD-10-CM

## 2021-05-02 RX ORDER — ATORVASTATIN CALCIUM 10 MG/1
TABLET, FILM COATED ORAL
Qty: 90 TABLET | Refills: 1 | Status: SHIPPED | OUTPATIENT
Start: 2021-05-02 | End: 2021-11-04

## 2021-05-11 ENCOUNTER — RA CDI HCC (OUTPATIENT)
Dept: OTHER | Facility: HOSPITAL | Age: 61
End: 2021-05-11

## 2021-05-11 NOTE — PROGRESS NOTES
[facility]  coding opportunities          Chart reviewed, no opportunity found: CHART REVIEWED, NO OPPORTUNITY FOUND              Patients insurance company: [employer] (Medicare Advantage and Commercial)

## 2021-05-14 ENCOUNTER — HOSPITAL ENCOUNTER (OUTPATIENT)
Dept: RADIOLOGY | Age: 61
Discharge: HOME | End: 2021-05-14
Payer: COMMERCIAL

## 2021-05-14 VITALS — HEIGHT: 65 IN | WEIGHT: 196 LBS | BODY MASS INDEX: 32.65 KG/M2

## 2021-05-14 DIAGNOSIS — Z12.31 ENCOUNTER FOR SCREENING MAMMOGRAM FOR MALIGNANT NEOPLASM OF BREAST: ICD-10-CM

## 2021-05-14 PROCEDURE — 77067 SCR MAMMO BI INCL CAD: CPT

## 2021-05-14 PROCEDURE — 77063 BREAST TOMOSYNTHESIS BI: CPT

## 2021-05-17 ENCOUNTER — OFFICE VISIT (OUTPATIENT)
Dept: FAMILY MEDICINE CLINIC | Facility: CLINIC | Age: 61
End: 2021-05-17
Payer: COMMERCIAL

## 2021-05-17 VITALS
BODY MASS INDEX: 33.26 KG/M2 | WEIGHT: 199.6 LBS | TEMPERATURE: 98.7 F | HEART RATE: 73 BPM | OXYGEN SATURATION: 97 % | HEIGHT: 65 IN | RESPIRATION RATE: 12 BRPM

## 2021-05-17 DIAGNOSIS — E66.9 OBESITY, CLASS I, BMI 30-34.9: ICD-10-CM

## 2021-05-17 DIAGNOSIS — Z00.00 ANNUAL PHYSICAL EXAM: Primary | ICD-10-CM

## 2021-05-17 DIAGNOSIS — E78.2 MIXED HYPERLIPIDEMIA: ICD-10-CM

## 2021-05-17 DIAGNOSIS — F33.42 RECURRENT MAJOR DEPRESSIVE DISORDER, IN FULL REMISSION (HCC): ICD-10-CM

## 2021-05-17 PROCEDURE — 99396 PREV VISIT EST AGE 40-64: CPT | Performed by: FAMILY MEDICINE

## 2021-05-17 PROCEDURE — 1036F TOBACCO NON-USER: CPT | Performed by: FAMILY MEDICINE

## 2021-05-17 PROCEDURE — 3008F BODY MASS INDEX DOCD: CPT | Performed by: FAMILY MEDICINE

## 2021-05-17 NOTE — PATIENT INSTRUCTIONS

## 2021-05-17 NOTE — PROGRESS NOTES
[address] [facility] [address]    NAME: Landy Brown  AGE: 59 y o  SEX: female  : 1960     DATE: 2021     Assessment and Plan:     Problem List Items Addressed This Visit        Other    Recurrent major depressive disorder, in full remission ([address] )     Will leave zoloft at 100mg         Hyperlipidemia     Stable on lipitor         Obesity, Class I, BMI 30-34 9     Working on diet and exercise         Annual physical exam - Primary          Immunizations and preventive care screenings were discussed with patient today  Appropriate education was printed on patient's after visit summary  Counseling:  Dental Health: discussed importance of regular tooth brushing, flossing, and dental visits  · Exercise: the importance of regular exercise/physical activity was discussed  Recommend exercise 3-5 times per week for at least 30 minutes  Return in 1 year (on 2022)  Chief Complaint:     Chief Complaint   Patient presents with    Annual Exam      History of Present Illness:     Adult Annual Physical   Patient here for a comprehensive physical exam  The patient reports problems - depression causing increased eating and drinking  Diet and Physical Activity  · Diet/Nutrition: well balanced diet and frequent junk food  · Exercise: walking  Depression Screening  PHQ-9 Depression Screening    PHQ-9:   Frequency of the following problems over the past two weeks:           General Health  · Sleep: sleeps well  · Hearing: normal - bilateral   · Vision: no vision problems and wears glasses  · Dental: regular dental visits  /GYN Health  · Patient is: postmenopausal  · Last menstrual period: n/a  · Contraceptive method: n/a       Review of Systems:     Review of Systems   Past Medical History:     Past Medical History:   Diagnosis Date    Anxiety     Depression     Glaucoma       Past Surgical History:     Past Surgical History:   Procedure Laterality Date    TOOTH EXTRACTION        Social History:        Social History     Socioeconomic History    Marital status:       Spouse name: None    Number of children: None    Years of education: None    Highest education level: None   Occupational History    None   Social Needs    Financial resource strain: None    Food insecurity     Worry: None     Inability: None    Transportation needs     Medical: None     Non-medical: None   Tobacco Use    Smoking status: Never Smoker    Smokeless tobacco: Never Used   Substance and Sexual Activity    Alcohol use: Yes     Comment: socially    Drug use: No    Sexual activity: None   Lifestyle    Physical activity     Days per week: None     Minutes per session: None    Stress: None   Relationships    Social connections     Talks on phone: None     Gets together: None     Attends [employer] service: None     Active member of club or organization: None     Attends meetings of clubs or organizations: None     Relationship status: None    Intimate partner violence     Fear of current or ex partner: None     Emotionally abused: None     Physically abused: None     Forced sexual activity: None   Other Topics Concern    None   Social History Narrative    None      Family History:     Family History   Problem Relation Age of Onset    COPD Mother     Other Mother         tobacco use    Cancer Mother         bladder cancer    Cancer Father 79        bladder    Diabetes Father     Skin cancer Father     Cancer Sister 76        bladder    Breast cancer Sister 77    No Known Problems Daughter     No Known Problems Maternal Grandmother     No Known Problems Maternal Grandfather     No Known Problems Paternal Grandmother     No Known Problems Paternal Grandfather     No Known Problems Sister     No Known Problems Son     No Known Problems Maternal Aunt     No Known Problems Maternal Aunt       Current Medications: Current Outpatient Medications   Medication Sig Dispense Refill    atorvastatin (LIPITOR) 10 mg tablet TAKE 1 TABLET BY MOUTH EVERY DAY 90 tablet 1    cholecalciferol (VITAMIN D3) 1,000 units tablet Take 2 tablets (2,000 Units total) by mouth daily 60 tablet 0    Coenzyme Q10 (CO Q 10 PO) Take by mouth      famotidine (PEPCID) 20 mg tablet Take 20 mg by mouth daily      Inulin-Cholecalciferol 2 5-500 GM-UNIT CHEW Chew      Multiple Vitamins-Minerals (MULTIVITAMIN WITH MINERALS) tablet Take 1 tablet by mouth daily      Probiotic Product (PROBIOTIC-10 PO) Take by mouth      sertraline (ZOLOFT) 100 mg tablet Take 1 tablet (100 mg total) by mouth daily 90 tablet 3     No current facility-administered medications for this visit  Allergies:     No Known Allergies   Physical Exam:     Pulse 73   Temp 98 7 °F (37 1 °C) (Tympanic)   Resp 12   Ht 5' 4 84" (1 647 m)   Wt 90 5 kg (199 lb 9 6 oz)   SpO2 97%   BMI 33 38 kg/m²     Physical Exam  Vitals signs and nursing note reviewed  Constitutional:       Appearance: Normal appearance  She is well-developed  HENT:      Head: Normocephalic and atraumatic  Right Ear: Tympanic membrane, ear canal and external ear normal       Left Ear: Tympanic membrane, ear canal and external ear normal       Nose: Nose normal    Eyes:      Extraocular Movements: Extraocular movements intact  Conjunctiva/sclera: Conjunctivae normal       Pupils: Pupils are equal, round, and reactive to light  Neck:      Musculoskeletal: Normal range of motion and neck supple  Cardiovascular:      Rate and Rhythm: Normal rate and regular rhythm  Heart sounds: Normal heart sounds  Pulmonary:      Effort: Pulmonary effort is normal       Breath sounds: Normal breath sounds  Abdominal:      General: Abdomen is flat  Bowel sounds are normal       Palpations: Abdomen is soft  Musculoskeletal: Normal range of motion  Skin:     General: Skin is warm and dry  Capillary Refill: Capillary refill takes less than 2 seconds  Neurological:      General: No focal deficit present  Mental Status: She is alert and oriented to person, place, and time  Psychiatric:         Mood and Affect: Mood normal          Behavior: Behavior normal          Thought Content:  Thought content normal          Judgment: Judgment normal           Wojciech Seymour DO  [id number] [facility]

## 2021-09-12 DIAGNOSIS — F32.A DEPRESSION, UNSPECIFIED DEPRESSION TYPE: ICD-10-CM

## 2021-09-13 RX ORDER — SERTRALINE HYDROCHLORIDE 100 MG/1
TABLET, FILM COATED ORAL
Qty: 90 TABLET | Refills: 3 | Status: SHIPPED | OUTPATIENT
Start: 2021-09-13 | End: 2021-11-16 | Stop reason: SDUPTHER

## 2021-11-04 DIAGNOSIS — E78.2 MIXED HYPERLIPIDEMIA: ICD-10-CM

## 2021-11-04 RX ORDER — ATORVASTATIN CALCIUM 10 MG/1
TABLET, FILM COATED ORAL
Qty: 90 TABLET | Refills: 1 | Status: SHIPPED | OUTPATIENT
Start: 2021-11-04 | End: 2022-05-12

## 2021-11-09 ENCOUNTER — RA CDI HCC (OUTPATIENT)
Dept: OTHER | Facility: HOSPITAL | Age: 61
End: 2021-11-09

## 2021-11-16 ENCOUNTER — OFFICE VISIT (OUTPATIENT)
Dept: FAMILY MEDICINE CLINIC | Facility: CLINIC | Age: 61
End: 2021-11-16
Payer: COMMERCIAL

## 2021-11-16 VITALS
WEIGHT: 200 LBS | OXYGEN SATURATION: 97 % | DIASTOLIC BLOOD PRESSURE: 70 MMHG | TEMPERATURE: 98.5 F | BODY MASS INDEX: 33.32 KG/M2 | HEIGHT: 65 IN | RESPIRATION RATE: 16 BRPM | HEART RATE: 80 BPM | SYSTOLIC BLOOD PRESSURE: 110 MMHG

## 2021-11-16 DIAGNOSIS — E78.2 MIXED HYPERLIPIDEMIA: Primary | ICD-10-CM

## 2021-11-16 DIAGNOSIS — E66.9 OBESITY, CLASS I, BMI 30-34.9: ICD-10-CM

## 2021-11-16 DIAGNOSIS — F32.A DEPRESSION, UNSPECIFIED DEPRESSION TYPE: ICD-10-CM

## 2021-11-16 DIAGNOSIS — Z23 NEED FOR VACCINATION: ICD-10-CM

## 2021-11-16 DIAGNOSIS — F33.42 RECURRENT MAJOR DEPRESSIVE DISORDER, IN FULL REMISSION (HCC): ICD-10-CM

## 2021-11-16 DIAGNOSIS — Z12.11 SCREENING FOR COLON CANCER: ICD-10-CM

## 2021-11-16 PROCEDURE — 3008F BODY MASS INDEX DOCD: CPT | Performed by: FAMILY MEDICINE

## 2021-11-16 PROCEDURE — 90682 RIV4 VACC RECOMBINANT DNA IM: CPT

## 2021-11-16 PROCEDURE — 90471 IMMUNIZATION ADMIN: CPT

## 2021-11-16 PROCEDURE — 99214 OFFICE O/P EST MOD 30 MIN: CPT | Performed by: FAMILY MEDICINE

## 2021-11-16 RX ORDER — SERTRALINE HYDROCHLORIDE 100 MG/1
150 TABLET, FILM COATED ORAL DAILY
Qty: 135 TABLET | Refills: 3 | Status: SHIPPED | OUTPATIENT
Start: 2021-11-16 | End: 2022-05-18

## 2022-05-10 ENCOUNTER — RA CDI HCC (OUTPATIENT)
Dept: OTHER | Facility: HOSPITAL | Age: 62
End: 2022-05-10

## 2022-05-10 NOTE — PROGRESS NOTES
[facility]  coding opportunities       Chart reviewed, no opportunity found: CHART REVIEWED, NO OPPORTUNITY FOUND        Patients Insurance        Commercial Insurance: [address] [place] [address]

## 2022-05-16 ENCOUNTER — APPOINTMENT (OUTPATIENT)
Dept: LAB | Facility: CLINIC | Age: 62
End: 2022-05-16
Payer: COMMERCIAL

## 2022-05-16 DIAGNOSIS — E78.2 MIXED HYPERLIPIDEMIA: ICD-10-CM

## 2022-05-16 LAB
ALBUMIN SERPL BCP-MCNC: 3.7 G/DL (ref 3.5–5)
ALP SERPL-CCNC: 55 U/L (ref 34–104)
ALT SERPL W P-5'-P-CCNC: 22 U/L (ref 7–52)
ANION GAP SERPL CALCULATED.3IONS-SCNC: 8 MMOL/L (ref 4–13)
AST SERPL W P-5'-P-CCNC: 18 U/L (ref 13–39)
BILIRUB SERPL-MCNC: 0.74 MG/DL (ref 0.2–1)
BUN SERPL-MCNC: 15 MG/DL (ref 5–25)
CALCIUM SERPL-MCNC: 9.2 MG/DL (ref 8.4–10.2)
CHLORIDE SERPL-SCNC: 103 MMOL/L (ref 96–108)
CHOLEST SERPL-MCNC: 185 MG/DL
CO2 SERPL-SCNC: 30 MMOL/L (ref 21–32)
CREAT SERPL-MCNC: 0.66 MG/DL (ref 0.6–1.3)
GFR SERPL CREATININE-BSD FRML MDRD: 93 ML/MIN/1.73SQ M
GLUCOSE P FAST SERPL-MCNC: 87 MG/DL (ref 65–99)
HDLC SERPL-MCNC: 68 MG/DL
LDLC SERPL CALC-MCNC: 99 MG/DL (ref 0–100)
POTASSIUM SERPL-SCNC: 3.7 MMOL/L (ref 3.5–5.3)
PROT SERPL-MCNC: 7 G/DL (ref 6.4–8.4)
SODIUM SERPL-SCNC: 141 MMOL/L (ref 135–147)
TRIGL SERPL-MCNC: 89 MG/DL
TSH SERPL DL<=0.05 MIU/L-ACNC: 4.1 UIU/ML (ref 0.45–4.5)

## 2022-05-16 PROCEDURE — 84443 ASSAY THYROID STIM HORMONE: CPT

## 2022-05-16 PROCEDURE — 80061 LIPID PANEL: CPT

## 2022-05-16 PROCEDURE — 80053 COMPREHEN METABOLIC PANEL: CPT

## 2022-05-16 PROCEDURE — 36415 COLL VENOUS BLD VENIPUNCTURE: CPT

## 2022-05-18 ENCOUNTER — OFFICE VISIT (OUTPATIENT)
Dept: FAMILY MEDICINE CLINIC | Facility: CLINIC | Age: 62
End: 2022-05-18
Payer: COMMERCIAL

## 2022-05-18 VITALS
HEIGHT: 65 IN | BODY MASS INDEX: 34.72 KG/M2 | WEIGHT: 208.4 LBS | RESPIRATION RATE: 14 BRPM | SYSTOLIC BLOOD PRESSURE: 122 MMHG | DIASTOLIC BLOOD PRESSURE: 80 MMHG | TEMPERATURE: 97.6 F

## 2022-05-18 DIAGNOSIS — Z00.00 ANNUAL PHYSICAL EXAM: Primary | ICD-10-CM

## 2022-05-18 PROCEDURE — 99396 PREV VISIT EST AGE 40-64: CPT | Performed by: FAMILY MEDICINE

## 2022-05-18 PROCEDURE — 3008F BODY MASS INDEX DOCD: CPT | Performed by: FAMILY MEDICINE

## 2022-05-18 PROCEDURE — 1036F TOBACCO NON-USER: CPT | Performed by: FAMILY MEDICINE

## 2022-05-18 PROCEDURE — 3725F SCREEN DEPRESSION PERFORMED: CPT | Performed by: FAMILY MEDICINE

## 2022-05-18 RX ORDER — CEPHALEXIN 500 MG/1
CAPSULE ORAL
COMMUNITY
Start: 2022-05-13

## 2022-05-18 NOTE — PATIENT INSTRUCTIONS

## 2022-05-18 NOTE — PROGRESS NOTES
[address] [facility] [address]    NAME: Caren Vallejo  AGE: 57 y o  SEX: female  : 1960     DATE: 2022     Assessment and Plan:     Problem List Items Addressed This Visit        Other    Annual physical exam - Primary          Immunizations and preventive care screenings were discussed with patient today  Appropriate education was printed on patient's after visit summary  Counseling:  Dental Health: discussed importance of regular tooth brushing, flossing, and dental visits  BMI Counseling: Body mass index is 35 02 kg/m²  The BMI is above normal  Nutrition recommendations include encouraging healthy choices of fruits and vegetables  Exercise recommendations include exercising 3-5 times per week  No pharmacotherapy was ordered  Rationale for BMI follow-up plan is due to patient being overweight or obese  Return in 6 months (on 2022) for Recheck  Chief Complaint:     Chief Complaint   Patient presents with    Annual Exam     Patient here for annual wellness exam       History of Present Illness:     Adult Annual Physical   Patient here for a comprehensive physical exam  The patient reports problems - happy but big issue is her weight  Diet and Physical Activity  Diet/Nutrition: well balanced diet  Exercise: walking        Depression Screening  PHQ-2/9 Depression Screening    Little interest or pleasure in doing things: 0 - not at all  Feeling down, depressed, or hopeless: 0 - not at all  Trouble falling or staying asleep, or sleeping too much: 0 - not at all  Feeling tired or having little energy: 0 - not at all  Poor appetite or overeatin - not at all  Feeling bad about yourself - or that you are a failure or have let yourself or your family down: 0 - not at all  Trouble concentrating on things, such as reading the newspaper or watching television: 0 - not at all  Moving or speaking so slowly that other people could have noticed  Or the opposite - being so fidgety or restless that you have been moving around a lot more than usual: 0 - not at all  Thoughts that you would be better off dead, or of hurting yourself in some way: 0 - not at all  PHQ-9 Score: 0   PHQ-9 Interpretation: No or Minimal depression        General Health  Sleep: sleeps well  Hearing: normal - bilateral   Vision: wears glasses  Dental: brushes teeth twice daily  /GYN Health  Patient is: postmenopausal  Last menstrual period: n/a  Contraceptive method: n/a  Review of Systems:     Review of Systems   Constitutional: Negative  HENT: Negative  Eyes: Negative  Respiratory: Negative  Gastrointestinal:        [place]   Endocrine: Negative  Genitourinary: Negative  Musculoskeletal: Negative  Skin: Negative  Allergic/Immunologic: Negative  Neurological: Negative  Hematological: Negative  Psychiatric/Behavioral: Negative  Past Medical History:     Past Medical History:   Diagnosis Date    Anxiety     Depression     Glaucoma       Past Surgical History:     Past Surgical History:   Procedure Laterality Date    TOOTH EXTRACTION        Social History:     Social History     Socioeconomic History    Marital status:       Spouse name: None    Number of children: None    Years of education: None    Highest education level: None   Occupational History    None   Tobacco Use    Smoking status: Never Smoker    Smokeless tobacco: Never Used   Vaping Use    Vaping Use: Never used   Substance and Sexual Activity    Alcohol use: Yes     Comment: socially    Drug use: No    Sexual activity: Not Currently   Other Topics Concern    None   Social History Narrative    None     Social Determinants of Health     Financial Resource Strain: Not on file   Food Insecurity: Not on file   Transportation Needs: Not on file   Physical Activity: Not on file   Stress: Not on file   Social Connections: Not on file   Intimate Partner Violence: Not on file   Housing Stability: Not on file      Family History:     Family History   Problem Relation Age of Onset    COPD Mother     Other Mother         tobacco use    Cancer Mother         bladder cancer    Cancer Father 79        bladder    Diabetes Father     Skin cancer Father     Cancer Sister 76        bladder    Breast cancer Sister 77    No Known Problems Daughter     No Known Problems Maternal Grandmother     No Known Problems Maternal Grandfather     No Known Problems Paternal Grandmother     No Known Problems Paternal Grandfather     No Known Problems Sister     No Known Problems Son     No Known Problems Maternal Aunt     No Known Problems Maternal Aunt       Current Medications:     Current Outpatient Medications   Medication Sig Dispense Refill    atorvastatin (LIPITOR) 10 mg tablet Take 1 tablet (10 mg total) by mouth in the morning  30 tablet 0    cephalexin (KEFLEX) 500 mg capsule TAKE 1 CAPSULE BY MOUTH TWICE A DAY FOR 7 DAYS      cholecalciferol (VITAMIN D3) 1,000 units tablet Take 2 tablets (2,000 Units total) by mouth daily 60 tablet 0    Coenzyme Q10 (CO Q 10 PO) Take by mouth      famotidine (PEPCID) 20 mg tablet Take 20 mg by mouth daily      Inulin-Cholecalciferol 2 5-500 GM-UNIT CHEW Chew      Multiple Vitamins-Minerals (MULTIVITAMIN WITH MINERALS) tablet Take 1 tablet by mouth daily      Probiotic Product (PROBIOTIC-10 PO) Take by mouth      sertraline (ZOLOFT) 100 mg tablet Take 1 5 tablets (150 mg total) by mouth daily (Patient taking differently: Take 100 mg by mouth in the morning ) 135 tablet 3    VITAMIN D PO Take by mouth       No current facility-administered medications for this visit        Allergies:     No Known Allergies   Physical Exam:     /80 (BP Location: Left arm, Patient Position: Sitting, Cuff Size: Large)   Temp 97 6 °F (36 4 °C)   Resp 14   Ht 5' 4 69" (1 643 m)   Wt 94 5 kg (208 lb 6 4 oz) BMI 35 02 kg/m²     Physical Exam  Vitals and nursing note reviewed  Constitutional:       Appearance: Normal appearance  She is well-developed  HENT:      Head: Normocephalic and atraumatic  Right Ear: External ear normal       Left Ear: External ear normal       Nose: Nose normal    Eyes:      Extraocular Movements: Extraocular movements intact  Conjunctiva/sclera: Conjunctivae normal       Pupils: Pupils are equal, round, and reactive to light  Cardiovascular:      Rate and Rhythm: Normal rate and regular rhythm  Heart sounds: Normal heart sounds  Pulmonary:      Effort: Pulmonary effort is normal       Breath sounds: Normal breath sounds  Abdominal:      General: Abdomen is flat  Bowel sounds are normal       Palpations: Abdomen is soft  Musculoskeletal:         General: Normal range of motion  Cervical back: Normal range of motion and neck supple  Skin:     General: Skin is warm and dry  Capillary Refill: Capillary refill takes less than 2 seconds  Neurological:      General: No focal deficit present  Mental Status: She is alert and oriented to person, place, and time  Psychiatric:         Mood and Affect: Mood normal          Behavior: Behavior normal          Thought Content:  Thought content normal          Judgment: Judgment normal           Nikki Reynoso DO  [id number] [facility]

## 2022-05-20 ENCOUNTER — HOSPITAL ENCOUNTER (OUTPATIENT)
Dept: RADIOLOGY | Age: 62
Discharge: HOME | End: 2022-05-20
Payer: COMMERCIAL

## 2022-05-20 VITALS — BODY MASS INDEX: 34.72 KG/M2 | HEIGHT: 65 IN | WEIGHT: 208.4 LBS

## 2022-05-20 DIAGNOSIS — Z12.31 ENCOUNTER FOR SCREENING MAMMOGRAM FOR MALIGNANT NEOPLASM OF BREAST: ICD-10-CM

## 2022-05-20 PROCEDURE — 77063 BREAST TOMOSYNTHESIS BI: CPT

## 2022-05-20 PROCEDURE — 77067 SCR MAMMO BI INCL CAD: CPT

## 2022-06-04 DIAGNOSIS — E78.2 MIXED HYPERLIPIDEMIA: ICD-10-CM

## 2022-06-04 RX ORDER — ATORVASTATIN CALCIUM 10 MG/1
10 TABLET, FILM COATED ORAL DAILY
Qty: 90 TABLET | Refills: 1 | Status: SHIPPED | OUTPATIENT
Start: 2022-06-04 | End: 2022-07-04

## 2022-09-22 ENCOUNTER — VBI (OUTPATIENT)
Dept: ADMINISTRATIVE | Facility: OTHER | Age: 62
End: 2022-09-22

## 2022-10-17 LAB — COLOGUARD RESULT REPORTABLE: NEGATIVE

## 2022-11-14 DIAGNOSIS — F32.A DEPRESSION, UNSPECIFIED DEPRESSION TYPE: ICD-10-CM

## 2022-11-14 RX ORDER — SERTRALINE HYDROCHLORIDE 100 MG/1
TABLET, FILM COATED ORAL
Qty: 135 TABLET | Refills: 3 | Status: SHIPPED | OUTPATIENT
Start: 2022-11-14

## 2022-11-18 ENCOUNTER — TELEPHONE (OUTPATIENT)
Dept: ADMINISTRATIVE | Facility: OTHER | Age: 62
End: 2022-11-18

## 2022-11-18 NOTE — TELEPHONE ENCOUNTER
----- Message from Pankaj Burks sent at 11/18/2022  8:11 AM EST -----  Regarding: Care Gap Request  11/18/22 8:11 AM    Hello, our patient attached above has had Pap Smear (HPV) aka Cervical Cancer Screening completed/performed  Please assist in updating the patient chart by pulling the Care Everywhere (CE) document  The date of service is 01/19/2021       Thank you,  Pankaj [facility]

## 2022-11-21 ENCOUNTER — OFFICE VISIT (OUTPATIENT)
Dept: FAMILY MEDICINE CLINIC | Facility: CLINIC | Age: 62
End: 2022-11-21

## 2022-11-21 VITALS
HEART RATE: 80 BPM | DIASTOLIC BLOOD PRESSURE: 70 MMHG | HEIGHT: 65 IN | RESPIRATION RATE: 14 BRPM | WEIGHT: 218 LBS | OXYGEN SATURATION: 99 % | SYSTOLIC BLOOD PRESSURE: 120 MMHG | TEMPERATURE: 96.8 F | BODY MASS INDEX: 36.32 KG/M2

## 2022-11-21 DIAGNOSIS — E78.2 MIXED HYPERLIPIDEMIA: Primary | ICD-10-CM

## 2022-11-21 DIAGNOSIS — F41.9 ANXIETY: ICD-10-CM

## 2022-11-21 DIAGNOSIS — E66.01 CLASS 2 SEVERE OBESITY DUE TO EXCESS CALORIES WITH SERIOUS COMORBIDITY AND BODY MASS INDEX (BMI) OF 36.0 TO 36.9 IN ADULT (HCC): ICD-10-CM

## 2022-11-21 DIAGNOSIS — F33.42 RECURRENT MAJOR DEPRESSIVE DISORDER, IN FULL REMISSION (HCC): ICD-10-CM

## 2022-11-21 DIAGNOSIS — Z23 NEED FOR VACCINATION: ICD-10-CM

## 2022-11-21 RX ORDER — OMEPRAZOLE 20 MG/1
20 CAPSULE, DELAYED RELEASE ORAL DAILY
COMMUNITY

## 2022-11-21 NOTE — TELEPHONE ENCOUNTER
Upon review of the In Basket request we were able to locate, review, and update the patient chart as requested for Pap Smear (HPV) aka Cervical Cancer Screening  Any additional questions or concerns should be emailed to the Practice Liaisons via the appropriate education email address, please do not reply via In Basket  Thank you  Claudia Oreilly MA       Patient not found in [employer] portal for Care Gap Closure

## 2022-11-21 NOTE — PROGRESS NOTES
Assessment/Plan:    1  Mixed hyperlipidemia  Assessment & Plan:  Stable on lipitor  Check lipids      2  Anxiety  Assessment & Plan:  Stable on zoloft  Down to 1 pill      3  Class 2 severe obesity due to excess calories with serious comorbidity and body mass index (BMI) of 36 0 to 36 9 in adult [facility])  Assessment & Plan:  Discussed diet and exercise      4  Recurrent major depressive disorder, in full remission ([address] )  Assessment & Plan:  Stable on zoloft      5  Need for vaccination  -     influenza vaccine, quadrivalent, recombinant, PF, 0 5 mL, for patients 18 yr+ (FLUBLOK)            There are no Patient Instructions on file for this visit  Return for Annual physical     Subjective:      Patient ID: Lyndsay Gupta is a 57 y o  female  Chief Complaint   Patient presents with   • Follow-up     Patient here for 6 month follow up        Here for follow up  Happy and c/o of overweight  Flu shot today  Had boyfriend and is in love      Hyperlipidemia  This is a chronic problem  The current episode started more than 1 year ago  The problem is controlled  Recent lipid tests were reviewed and are normal  There are no known factors aggravating her hyperlipidemia  Current antihyperlipidemic treatment includes statins  The current treatment provides significant improvement of lipids  There are no compliance problems  The following portions of the patient's history were reviewed and updated as appropriate: allergies, current medications, past family history, past medical history, past social history, past surgical history and problem list     Review of Systems   Constitutional: Negative  HENT: Negative  Eyes: Negative  Respiratory: Positive for cough  Cardiovascular: Negative  Gastrointestinal: Negative  Endocrine: Negative  Genitourinary: Negative  Musculoskeletal: Negative  Skin: Negative  Allergic/Immunologic: Negative  Neurological: Negative  Hematological: Negative  Psychiatric/Behavioral: Negative  Current Outpatient Medications   Medication Sig Dispense Refill   • cholecalciferol (VITAMIN D3) 1,000 units tablet Take 2 tablets (2,000 Units total) by mouth daily 60 tablet 0   • Coenzyme Q10 (CO Q 10 PO) Take by mouth     • FIBER PO Take by mouth     • Multiple Vitamins-Minerals (MULTIVITAMIN WITH MINERALS) tablet Take 1 tablet by mouth daily     • omeprazole (PriLOSEC) 20 mg delayed release capsule Take 20 mg by mouth daily     • Probiotic Product (PROBIOTIC-10 PO) Take by mouth     • sertraline (ZOLOFT) 100 mg tablet TAKE 1 5 TABLETS BY MOUTH DAILY 135 tablet 3   • VITAMIN D PO Take by mouth     • atorvastatin (LIPITOR) 10 mg tablet TAKE 1 TABLET (10 MG TOTAL) BY MOUTH IN THE MORNING  90 tablet 1     No current facility-administered medications for this visit  Objective:    /70 (BP Location: Left arm, Patient Position: Sitting, Cuff Size: Large)   Pulse 80   Temp (!) 96 8 °F (36 °C) (Tympanic)   Resp 14   Ht 5' 5" (1 651 m)   Wt 98 9 kg (218 lb)   SpO2 99%   BMI 36 28 kg/m²        Physical Exam  Vitals and nursing note reviewed  Constitutional:       Appearance: Normal appearance  HENT:      Head: Normocephalic and atraumatic  Eyes:      Extraocular Movements: Extraocular movements intact  Pupils: Pupils are equal, round, and reactive to light  Cardiovascular:      Rate and Rhythm: Normal rate and regular rhythm  Pulses: Normal pulses  Pulmonary:      Effort: Pulmonary effort is normal       Breath sounds: Normal breath sounds  Abdominal:      General: Abdomen is flat  Palpations: Abdomen is soft  Musculoskeletal:      Cervical back: Normal range of motion and neck supple  Skin:     General: Skin is warm  Capillary Refill: Capillary refill takes less than 2 seconds  Neurological:      General: No focal deficit present  Mental Status: She is alert and oriented to person, place, and time     Psychiatric: Mood and Affect: Mood normal          Behavior: Behavior normal          Thought Content:  Thought content normal          Judgment: Judgment normal                 Nandini Lizarraga, DO

## 2023-02-04 DIAGNOSIS — E78.2 MIXED HYPERLIPIDEMIA: ICD-10-CM

## 2023-02-04 RX ORDER — ATORVASTATIN CALCIUM 10 MG/1
10 TABLET, FILM COATED ORAL DAILY
Qty: 90 TABLET | Refills: 1 | Status: SHIPPED | OUTPATIENT
Start: 2023-02-04 | End: 2023-03-06

## 2023-05-12 ENCOUNTER — RA CDI HCC (OUTPATIENT)
Dept: OTHER | Facility: HOSPITAL | Age: 63
End: 2023-05-12

## 2023-05-12 NOTE — PROGRESS NOTES
Recurrent major depressive disorder, in full remission (HCC)Noted 7/16/2012  [F33 [id number]]      [facility]  coding opportunities          Chart Reviewed number of suggestions sent to Provider: 1     Patients Insurance        Commercial Insurance: [employer]

## 2023-07-12 DIAGNOSIS — E78.2 MIXED HYPERLIPIDEMIA: ICD-10-CM

## 2023-07-12 RX ORDER — ATORVASTATIN CALCIUM 10 MG/1
10 TABLET, FILM COATED ORAL DAILY
Qty: 90 TABLET | Refills: 3 | Status: SHIPPED | OUTPATIENT
Start: 2023-07-12 | End: 2023-08-11

## 2023-07-18 ENCOUNTER — OFFICE VISIT (OUTPATIENT)
Dept: FAMILY MEDICINE CLINIC | Facility: CLINIC | Age: 63
End: 2023-07-18
Payer: COMMERCIAL

## 2023-07-18 VITALS
SYSTOLIC BLOOD PRESSURE: 110 MMHG | WEIGHT: 219.2 LBS | TEMPERATURE: 97.3 F | RESPIRATION RATE: 16 BRPM | HEART RATE: 73 BPM | DIASTOLIC BLOOD PRESSURE: 80 MMHG | OXYGEN SATURATION: 97 % | BODY MASS INDEX: 36.52 KG/M2 | HEIGHT: 65 IN

## 2023-07-18 DIAGNOSIS — Z00.00 WELCOME TO MEDICARE PREVENTIVE VISIT: Primary | ICD-10-CM

## 2023-07-18 DIAGNOSIS — Z23 NEED FOR VACCINATION: ICD-10-CM

## 2023-07-18 DIAGNOSIS — F33.42 RECURRENT MAJOR DEPRESSIVE DISORDER, IN FULL REMISSION (HCC): ICD-10-CM

## 2023-07-18 DIAGNOSIS — Z13.820 ENCOUNTER FOR OSTEOPOROSIS SCREENING IN ASYMPTOMATIC POSTMENOPAUSAL PATIENT: ICD-10-CM

## 2023-07-18 DIAGNOSIS — E78.2 MIXED HYPERLIPIDEMIA: ICD-10-CM

## 2023-07-18 DIAGNOSIS — E66.01 CLASS 2 SEVERE OBESITY DUE TO EXCESS CALORIES WITH SERIOUS COMORBIDITY AND BODY MASS INDEX (BMI) OF 36.0 TO 36.9 IN ADULT (HCC): ICD-10-CM

## 2023-07-18 DIAGNOSIS — Z12.31 ENCOUNTER FOR SCREENING MAMMOGRAM FOR MALIGNANT NEOPLASM OF BREAST: ICD-10-CM

## 2023-07-18 DIAGNOSIS — Z78.0 ENCOUNTER FOR OSTEOPOROSIS SCREENING IN ASYMPTOMATIC POSTMENOPAUSAL PATIENT: ICD-10-CM

## 2023-07-18 PROCEDURE — G0402 INITIAL PREVENTIVE EXAM: HCPCS | Performed by: FAMILY MEDICINE

## 2023-07-18 PROCEDURE — G0009 ADMIN PNEUMOCOCCAL VACCINE: HCPCS

## 2023-07-18 PROCEDURE — 90677 PCV20 VACCINE IM: CPT

## 2023-07-18 PROCEDURE — G0403 EKG FOR INITIAL PREVENT EXAM: HCPCS | Performed by: FAMILY MEDICINE

## 2023-07-18 NOTE — ASSESSMENT & PLAN NOTE
prevnar 20 today  Will get shingrix at pharmacy  [facility] later this year- will get tdap at pharmacy

## 2023-07-18 NOTE — PROGRESS NOTES
Assessment and Plan:     Problem List Items Addressed This Visit        Other    Recurrent major depressive disorder, in full remission ([address])     Down to 100mg zoloft         Hyperlipidemia    Relevant Orders    CBC    Comprehensive metabolic panel    Lipid Panel with Direct LDL reflex    TSH, 3rd generation with Free T4 reflex    Class 2 severe obesity due to excess calories with serious comorbidity and body mass index (BMI) of 36.0 to 36.9 in adult [facility])     Watch diet  Increase exercise         Need for vaccination    Relevant Orders    Pneumococcal Conjugate Vaccine 20-valent (Pcv20) (Completed)    Welcome to Medicare preventive visit - Primary     prevnar 20 today  Will get shingrix at pharmacy  Grandchild later this year- will get tdap at pharmacy         Relevant Orders    POCT ECG (Completed)    Encounter for screening mammogram for malignant neoplasm of breast    Relevant Orders    Mammo screening bilateral w 3d & cad   Other Visit Diagnoses     Encounter for osteoporosis screening in asymptomatic postmenopausal patient        Relevant Orders    DXA bone density spine hip and pelvis        BMI Counseling: Body mass index is 36.79 kg/m². The BMI is above normal. Nutrition recommendations include encouraging healthy choices of fruits and vegetables. Exercise recommendations include exercising 3-5 times per week. No pharmacotherapy was ordered. Rationale for BMI follow-up plan is due to patient being overweight or obese. Preventive health issues were discussed with patient, and age appropriate screening tests were ordered as noted in patient's After Visit Summary. Personalized health advice and appropriate referrals for health education or preventive services given if needed, as noted in patient's After Visit Summary.      History of Present Illness:     Patient presents for a Medicare Wellness Visit    Here for awv  Welcome to medicare  ekg reviewed     Patient Care Team:  Christiana Spurling, DO as PCP - General  Levy Mclaughlin DO as PCP - PCP-[facility] Attributed-Roster  DO Abdifatah Carranza MD Natalie Coral, MD Smiley Bob, MD     Review of Systems:     Review of Systems   Constitutional: Negative. HENT: Negative. Eyes: Negative. Respiratory: Negative. Cardiovascular: Negative. Gastrointestinal: Negative. Endocrine: Negative. Genitourinary: Negative. Musculoskeletal: Negative. Skin: Negative. Allergic/Immunologic: Negative. Neurological: Negative. Hematological: Negative. Psychiatric/Behavioral: Negative.          Problem List:     Patient Active Problem List   Diagnosis   • Anxiety   • Recurrent major depressive disorder, in full remission ([address])   • Hyperlipidemia   • Uterine prolapse   • Vitamin D deficiency   • Class 2 severe obesity due to excess calories with serious comorbidity and body mass index (BMI) of 36.0 to 36.9 in adult [facility])   • Well adult exam   • Annual physical exam   • Numbness and tingling of left hand   • Need for vaccination   • Cervical radiculitis   • Carpal tunnel syndrome on left   • Welcome to Medicare preventive visit   • Encounter for screening mammogram for malignant neoplasm of breast      Past Medical and Surgical History:     Past Medical History:   Diagnosis Date   • Anxiety    • Depression    • Glaucoma      Past Surgical History:   Procedure Laterality Date   • TOOTH EXTRACTION        Family History:     Family History   Problem Relation Age of Onset   • COPD Mother    • Other Mother         tobacco use   • Cancer Mother         bladder cancer   • Cancer Father 79        bladder   • Diabetes Father    • Skin cancer Father    • Cancer Sister 76        bladder   • Breast cancer Sister 77   • No Known Problems Daughter    • No Known Problems Maternal Grandmother    • No Known Problems Maternal Grandfather    • No Known Problems Paternal Grandmother    • No Known Problems Paternal Grandfather    • No Known Problems Sister    • No Known Problems Son    • No Known Problems Maternal Aunt    • No Known Problems Maternal Aunt       Social History:     Social History     Socioeconomic History   • Marital status:      Spouse name: None   • Number of children: None   • Years of education: None   • Highest education level: None   Occupational History   • None   Tobacco Use   • Smoking status: Never     Passive exposure: Never   • Smokeless tobacco: Never   Vaping Use   • Vaping Use: Never used   Substance and Sexual Activity   • Alcohol use: Yes     Comment: socially   • Drug use: No   • Sexual activity: Yes     Partners: Male   Other Topics Concern   • None   Social History Narrative   • None     Social Determinants of Health     Financial Resource Strain: Low Risk  (7/18/2023)    Overall Financial Resource Strain (CARDIA)    • Difficulty of Paying Living Expenses: Not very hard   Food Insecurity: Not on file   Transportation Needs: No Transportation Needs (7/18/2023)    PRAPARE - Transportation    • Lack of Transportation (Medical): No    • Lack of Transportation (Non-Medical):  No   Physical Activity: Not on file   Stress: Not on file   Social Connections: Not on file   Intimate Partner Violence: Not on file   Housing Stability: Not on file      Medications and Allergies:     Current Outpatient Medications   Medication Sig Dispense Refill   • atorvastatin (LIPITOR) 10 mg tablet Take 1 tablet (10 mg total) by mouth daily 90 tablet 3   • Calcium Carbonate (CALCIUM 600 PO) Take by mouth     • cholecalciferol (VITAMIN D3) 1,000 units tablet Take 2 tablets (2,000 Units total) by mouth daily 60 tablet 0   • Coenzyme Q10 (CO Q 10 PO) Take by mouth     • Multiple Vitamins-Minerals (MULTIVITAMIN WITH MINERALS) tablet Take 1 tablet by mouth daily     • omeprazole (PriLOSEC) 20 mg delayed release capsule Take 20 mg by mouth daily     • sertraline (ZOLOFT) 100 mg tablet TAKE 1 AND 1/2 TABLETS BY MOUTH EVERY DAY (Patient taking differently: Take 100 mg by mouth daily) 135 tablet 0   • VITAMIN D PO Take by mouth     • FIBER PO Take by mouth (Patient not taking: Reported on 7/18/2023)     • Probiotic Product (PROBIOTIC-10 PO) Take by mouth       No current facility-administered medications for this visit. No Known Allergies   Immunizations:     Immunization History   Administered Date(s) Administered   • COVID-19 MODERNA VACC 0.5 ML IM 04/12/2021, 05/10/2021, 01/11/2022   • INFLUENZA 09/08/2015, 10/12/2016, 11/07/2017, 09/20/2018, 11/16/2021   • Influenza Quadrivalent Preservative Free 3 years and older IM 09/08/2015, 10/12/2016, 11/07/2017   • Influenza, recombinant, quadrivalent,injectable, preservative free 09/20/2018, 11/18/2019, 11/11/2020, 11/16/2021, 11/21/2022   • Influenza, seasonal, injectable 11/16/2012, 09/18/2013   • Pneumococcal Conjugate Vaccine 20-valent (Pcv20), Polysace 07/18/2023   • Tdap 08/15/2014   • Tuberculin Skin Test-PPD Intradermal 08/28/2019   • Zoster Vaccine Recombinant 11/11/2020      Health Maintenance:         Topic Date Due   • Breast Cancer Screening: Mammogram  05/20/2023   • Cervical Cancer Screening  01/19/2024   • Colorectal Cancer Screening  10/11/2025   • HIV Screening  Completed   • Hepatitis C Screening  Completed         Topic Date Due   • COVID-19 Vaccine (4 - Remus Homans series) 03/08/2022   • Influenza Vaccine (1) 09/01/2023      Medicare Screening Tests and Risk Assessments:     Mariah Reyes is here for her Welcome to Medicare visit. Health Risk Assessment:   Patient rates overall health as excellent. Patient feels that their physical health rating is slightly better. Patient is very satisfied with their life. Eyesight was rated as same. Hearing was rated as same. Patient feels that their emotional and mental health rating is much better. Patients states they are never, rarely angry. Patient states they are sometimes unusually tired/fatigued. Pain experienced in the last 7 days has been none. Patient states that she has experienced no weight loss or gain in last 6 months. Depression Screening:   PHQ-9 Score: 0      Fall Risk Screening: In the past year, patient has experienced: no history of falling in past year      Urinary Incontinence Screening:   Patient has not leaked urine accidently in the last six months. Home Safety:  Patient does not have trouble with stairs inside or outside of their home. Patient has working smoke alarms and has working carbon monoxide detector. Home safety hazards include: none. Nutrition:   Current diet is Regular. Medications:   Patient is currently taking over-the-counter supplements. OTC medications include: see medication list. Patient is able to manage medications. Activities of Daily Living (ADLs)/Instrumental Activities of Daily Living (IADLs):   Walk and transfer into and out of bed and chair?: Yes  Dress and groom yourself?: Yes    Bathe or shower yourself?: Yes    Feed yourself?  Yes  Do your laundry/housekeeping?: Yes  Manage your money, pay your bills and track your expenses?: Yes  Make your own meals?: Yes    Do your own shopping?: Yes    Previous Hospitalizations:   Any hospitalizations or ED visits within the last 12 months?: No      Advance Care Planning:   Living will: No    Durable POA for healthcare: No    Advanced directive: No    Five wishes given: Yes      PREVENTIVE SCREENINGS      Cardiovascular Screening:    General: Screening Not Indicated and History Lipid Disorder      Colorectal Cancer Screening:     General: Screening Current      Breast Cancer Screening:     General: Screening Current      Cervical Cancer Screening:    General: Screening Not Indicated      Lung Cancer Screening:     General: Screening Not Indicated      Hepatitis C Screening:    General: Screening Current    Screening, Brief Intervention, and Referral to Treatment (SBIRT)    Screening  Typical number of drinks in a day: 2  Typical number of drinks in a week: 14  Interpretation: Low risk drinking behavior. Single Item Drug Screening:  How often have you used an illegal drug (including marijuana) or a prescription medication for non-medical reasons in the past year? never    Single Item Drug Screen Score: 0  Interpretation: Negative screen for possible drug use disorder    Vision Screening    Right eye Left eye Both eyes   Without correction      With correction 20/30 20/20 20/25        Physical Exam:     /80 (BP Location: Left arm, Patient Position: Sitting, Cuff Size: Large)   Pulse 73   Temp (!) 97.3 °F (36.3 °C) (Tympanic)   Resp 16   Ht 5' 4.72" (1.644 m)   Wt 99.4 kg (219 lb 3.2 oz)   SpO2 97%   BMI 36.79 kg/m²     Physical Exam  Vitals and nursing note reviewed. Constitutional:       Appearance: She is well-developed. HENT:      Head: Normocephalic and atraumatic. Right Ear: External ear normal.      Left Ear: External ear normal.      Nose: Nose normal.   Eyes:      Extraocular Movements: Extraocular movements intact. Conjunctiva/sclera: Conjunctivae normal.      Pupils: Pupils are equal, round, and reactive to light. Cardiovascular:      Rate and Rhythm: Normal rate and regular rhythm. Heart sounds: Normal heart sounds. Pulmonary:      Effort: Pulmonary effort is normal.      Breath sounds: Normal breath sounds. Abdominal:      General: Abdomen is flat. Bowel sounds are normal.      Palpations: Abdomen is soft. Musculoskeletal:         General: Normal range of motion. Cervical back: Normal range of motion and neck supple. Skin:     General: Skin is warm and dry. Capillary Refill: Capillary refill takes less than 2 seconds. Neurological:      General: No focal deficit present. Mental Status: She is alert and oriented to person, place, and time. Psychiatric:         Mood and Affect: Mood normal.         Behavior: Behavior normal.         Thought Content:  Thought content normal.         Judgment: Judgment normal.          Christiana Spurling, DO

## 2023-07-18 NOTE — PATIENT INSTRUCTIONS
Medicare Preventive Visit Patient Instructions  Thank you for completing your Welcome to Medicare Visit or Medicare Annual Wellness Visit today. Your next wellness visit will be due in one year (7/18/2024). The screening/preventive services that you may require over the next 5-10 years are detailed below. Some tests may not apply to you based off risk factors and/or age. Screening tests ordered at today's visit but not completed yet may show as past due. Also, please note that scanned in results may not display below. Preventive Screenings:  Service Recommendations Previous Testing/Comments   Colorectal Cancer Screening  * Colonoscopy    * Fecal Occult Blood Test (FOBT)/Fecal Immunochemical Test (FIT)  * Fecal DNA/Cologuard Test  * Flexible Sigmoidoscopy Age: 43-73 years old   Colonoscopy: every 10 years (may be performed more frequently if at higher risk)  OR  FOBT/FIT: every 1 year  OR  Cologuard: every 3 years  OR  Sigmoidoscopy: every 5 years  Screening may be recommended earlier than age 39 if at higher risk for colorectal cancer. Also, an individualized decision between you and your healthcare provider will decide whether screening between the ages of 77-80 would be appropriate. Colonoscopy: 10/11/2022  FOBT/FIT: Not on file  Cologuard: 10/11/2022  Sigmoidoscopy: Not on file    Screening Current     Breast Cancer Screening Age: 36 years old  Frequency: every 1-2 years  Not required if history of left and right mastectomy Mammogram: 05/20/2022    Screening Current   Cervical Cancer Screening Between the ages of 21-29, pap smear recommended once every 3 years. Between the ages of 32-69, can perform pap smear with HPV co-testing every 5 years.    Recommendations may differ for women with a history of total hysterectomy, cervical cancer, or abnormal pap smears in past. Pap Smear: 01/19/2021    Screening Not Indicated   Hepatitis C Screening Once for adults born between 1945 and 1965  More frequently in patients at high risk for Hepatitis C Hep C Antibody: 11/13/2019    Screening Current   Diabetes Screening 1-2 times per year if you're at risk for diabetes or have pre-diabetes Fasting glucose: 87 mg/dL (5/16/2022)  A1C: 5.4 % (3/19/2021)      Cholesterol Screening Once every 5 years if you don't have a lipid disorder. May order more often based on risk factors. Lipid panel: 05/16/2022    Screening Not Indicated  History Lipid Disorder     Other Preventive Screenings Covered by Medicare:  1. Abdominal Aortic Aneurysm (AAA) Screening: covered once if your at risk. You're considered to be at risk if you have a family history of AAA. 2. Lung Cancer Screening: covers low dose CT scan once per year if you meet all of the following conditions: (1) Age 48-67; (2) No signs or symptoms of lung cancer; (3) Current smoker or have quit smoking within the last 15 years; (4) You have a tobacco smoking history of at least 20 pack years (packs per day multiplied by number of years you smoked); (5) You get a written order from a healthcare provider. 3. Glaucoma Screening: covered annually if you're considered high risk: (1) You have diabetes OR (2) Family history of glaucoma OR (3)  aged 48 and older OR (3)  American aged 72 and older  3. Osteoporosis Screening: covered every 2 years if you meet one of the following conditions: (1) You're estrogen deficient and at risk for osteoporosis based off medical history and other findings; (2) Have a vertebral abnormality; (3) On glucocorticoid therapy for more than 3 months; (4) Have primary hyperparathyroidism; (5) On osteoporosis medications and need to assess response to drug therapy. · Last bone density test (DXA Scan): Not on file. 5. HIV Screening: covered annually if you're between the age of 14-79. Also covered annually if you are younger than 13 and older than 72 with risk factors for HIV infection.  For pregnant patients, it is covered up to 3 times per pregnancy. Immunizations:  Immunization Recommendations   Influenza Vaccine Annual influenza vaccination during flu season is recommended for all persons aged >= 6 months who do not have contraindications   Pneumococcal Vaccine   * Pneumococcal conjugate vaccine = PCV13 (Prevnar 13), PCV15 (Vaxneuvance), PCV20 (Prevnar 20)  * Pneumococcal polysaccharide vaccine = PPSV23 (Pneumovax) Adults 20-63 years old: 1-3 doses may be recommended based on certain risk factors  Adults 72 years old: 1-2 doses may be recommended based off what pneumonia vaccine you previously received   Hepatitis B Vaccine 3 dose series if at intermediate or high risk (ex: diabetes, end stage renal disease, liver disease)   Tetanus (Td) Vaccine - COST NOT COVERED BY MEDICARE PART B Following completion of primary series, a booster dose should be given every 10 years to maintain immunity against tetanus. Td may also be given as tetanus wound prophylaxis. Tdap Vaccine - COST NOT COVERED BY MEDICARE PART B Recommended at least once for all adults. For pregnant patients, recommended with each pregnancy. Shingles Vaccine (Shingrix) - COST NOT COVERED BY MEDICARE PART B  2 shot series recommended in those aged 48 and above     Health Maintenance Due:      Topic Date Due   • Breast Cancer Screening: Mammogram  05/20/2023   • Cervical Cancer Screening  01/19/2024   • Colorectal Cancer Screening  10/11/2025   • HIV Screening  Completed   • Hepatitis C Screening  Completed     Immunizations Due:      Topic Date Due   • COVID-19 Vaccine (4 - Moderna series) 03/08/2022   • Pneumococcal Vaccine: 65+ Years (1 - PCV) Never done   • Influenza Vaccine (1) 09/01/2023     Advance Directives   What are advance directives? Advance directives are legal documents that state your wishes and plans for medical care. These plans are made ahead of time in case you lose your ability to make decisions for yourself.  Advance directives can apply to any medical decision, such as the treatments you want, and if you want to donate organs. What are the types of advance directives? There are many types of advance directives, and each state has rules about how to use them. You may choose a combination of any of the following:  · Living will: This is a written record of the treatment you want. You can also choose which treatments you do not want, which to limit, and which to stop at a certain time. This includes surgery, medicine, IV fluid, and tube feedings. · Durable power of  for healthcare [facility]): This is a written record that states who you want to make healthcare choices for you when you are unable to make them for yourself. This person, called a proxy, is usually a family member or a friend. You may choose more than 1 proxy. · Do not resuscitate (DNR) order:  A DNR order is used in case your heart stops beating or you stop breathing. It is a request not to have certain forms of treatment, such as CPR. A DNR order may be included in other types of advance directives. · Medical directive: This covers the care that you want if you are in a coma, near death, or unable to make decisions for yourself. You can list the treatments you want for each condition. Treatment may include pain medicine, surgery, blood transfusions, dialysis, IV or tube feedings, and a ventilator (breathing machine). · Values history: This document has questions about your views, beliefs, and how you feel and think about life. This information can help others choose the care that you would choose. Why are advance directives important? An advance directive helps you control your care. Although spoken wishes may be used, it is better to have your wishes written down. Spoken wishes can be misunderstood, or not followed. Treatments may be given even if you do not want them. An advance directive may make it easier for your family to make difficult choices about your care.    Weight Management   Why it is important to manage your weight:  Being overweight increases your risk of health conditions such as heart disease, high blood pressure, type 2 diabetes, and certain types of cancer. It can also increase your risk for osteoarthritis, sleep apnea, and other respiratory problems. Aim for a slow, steady weight loss. Even a small amount of weight loss can lower your risk of health problems. How to lose weight safely:  A safe and healthy way to lose weight is to eat fewer calories and get regular exercise. You can lose up about 1 pound a week by decreasing the number of calories you eat by 500 calories each day. Healthy meal plan for weight management:  A healthy meal plan includes a variety of foods, contains fewer calories, and helps you stay healthy. A healthy meal plan includes the following:  · Eat whole-grain foods more often. A healthy meal plan should contain fiber. Fiber is the part of grains, fruits, and vegetables that is not broken down by your body. Whole-grain foods are healthy and provide extra fiber in your diet. Some examples of whole-grain foods are whole-wheat breads and pastas, oatmeal, brown rice, and bulgur. · Eat a variety of vegetables every day. Include dark, leafy greens such as spinach, kale, ofelia greens, and mustard greens. Eat yellow and orange vegetables such as carrots, sweet potatoes, and winter squash. · Eat a variety of fruits every day. Choose fresh or canned fruit (canned in its own juice or light syrup) instead of juice. Fruit juice has very little or no fiber. · Eat low-fat dairy foods. Drink fat-free (skim) milk or 1% milk. Eat fat-free yogurt and low-fat cottage cheese. Try low-fat cheeses such as mozzarella and other reduced-fat cheeses. · Choose meat and other protein foods that are low in fat. Choose beans or other legumes such as split peas or lentils.  Choose fish, skinless poultry (chicken or turkey), or lean cuts of red meat (beef or pork). Before you cook meat or poultry, cut off any visible fat. · Use less fat and oil. Try baking foods instead of frying them. Add less fat, such as margarine, sour cream, regular salad dressing and mayonnaise to foods. Eat fewer high-fat foods. Some examples of high-fat foods include french fries, doughnuts, ice cream, and cakes. · Eat fewer sweets. Limit foods and drinks that are high in sugar. This includes candy, cookies, regular soda, and sweetened drinks. Exercise:  Exercise at least 30 minutes per day on most days of the week. Some examples of exercise include walking, biking, dancing, and swimming. You can also fit in more physical activity by taking the stairs instead of the elevator or parking farther away from stores. Ask your healthcare provider about the best exercise plan for you. © Copyright [employer] 2018 Information is for End User's use only and may not be sold, redistributed or otherwise used for commercial purposes.  All illustrations and images included in CareNotes® are the copyrighted property of [employer].D.A[employer]M., Inc. or [address] [place] [address]

## 2023-09-16 PROBLEM — Z00.00 WELCOME TO MEDICARE PREVENTIVE VISIT: Status: RESOLVED | Noted: 2023-07-18 | Resolved: 2023-09-16

## 2023-11-21 ENCOUNTER — RA CDI HCC (OUTPATIENT)
Dept: OTHER | Facility: HOSPITAL | Age: 63
End: 2023-11-21

## 2023-11-21 NOTE — PROGRESS NOTES
[address] [place] coding opportunities       Chart reviewed, no opportunity found: [id number]        Patients Insurance     Medicare Insurance: [employer]

## 2024-02-21 PROBLEM — Z00.00 WELL ADULT EXAM: Status: RESOLVED | Noted: 2018-03-20 | Resolved: 2024-02-21

## 2024-05-06 DIAGNOSIS — F32.A DEPRESSION, UNSPECIFIED DEPRESSION TYPE: ICD-10-CM

## 2024-05-07 RX ORDER — SERTRALINE HYDROCHLORIDE 100 MG/1
150 TABLET, FILM COATED ORAL DAILY
Qty: 135 TABLET | Refills: 1 | Status: SHIPPED | OUTPATIENT
Start: 2024-05-07

## 2024-07-07 DIAGNOSIS — E78.2 MIXED HYPERLIPIDEMIA: ICD-10-CM

## 2024-07-08 RX ORDER — ATORVASTATIN CALCIUM 10 MG/1
10 TABLET, FILM COATED ORAL DAILY
Qty: 90 TABLET | Refills: 0 | Status: SHIPPED | OUTPATIENT
Start: 2024-07-08

## 2024-09-29 ENCOUNTER — TRANSCRIBE ORDERS (OUTPATIENT)
Facility: HOSPITAL | Age: 64
End: 2024-09-29

## 2024-09-29 DIAGNOSIS — Z00.6 ENCOUNTER FOR EXAMINATION FOR NORMAL COMPARISON OR CONTROL IN CLINICAL RESEARCH PROGRAM: Primary | ICD-10-CM

## 2024-10-14 ENCOUNTER — TELEPHONE (OUTPATIENT)
Dept: FAMILY MEDICINE CLINIC | Facility: CLINIC | Age: 64
End: 2024-10-14

## 2024-11-14 ENCOUNTER — HOSPITAL ENCOUNTER (OUTPATIENT)
Dept: RADIOLOGY | Age: 64
Discharge: HOME | End: 2024-11-14
Payer: COMMERCIAL

## 2024-11-14 DIAGNOSIS — Z12.31 ENCOUNTER FOR SCREENING MAMMOGRAM FOR MALIGNANT NEOPLASM OF BREAST: ICD-10-CM

## 2024-11-14 PROCEDURE — 77063 BREAST TOMOSYNTHESIS BI: CPT

## 2024-11-14 PROCEDURE — 77067 SCR MAMMO BI INCL CAD: CPT

## 2024-11-15 DIAGNOSIS — F32.A DEPRESSION, UNSPECIFIED DEPRESSION TYPE: ICD-10-CM

## 2024-11-15 NOTE — TELEPHONE ENCOUNTER
Refill must be reviewed and completed by the office or provider. The refill is unable to be approved or denied by the medication management team.    Last seen 07.2023, no upcoming appointment and was to return in 4M - Please review to see if the refill is appropriate.

## 2024-11-18 ENCOUNTER — TELEPHONE (OUTPATIENT)
Age: 64
End: 2024-11-18

## 2024-11-18 DIAGNOSIS — E78.2 MIXED HYPERLIPIDEMIA: Primary | ICD-10-CM

## 2024-11-18 NOTE — TELEPHONE ENCOUNTER
PT requesting if there are any labs due, if she could have it ordered so she may get completed for an appt scheduled with Dr Del Castillo on 12/9. She stated she usually goes to the [facility].    Please advise    ThankYou

## 2024-11-19 RX ORDER — SERTRALINE HYDROCHLORIDE 100 MG/1
TABLET, FILM COATED ORAL
Qty: 135 TABLET | Refills: 0 | Status: SHIPPED | OUTPATIENT
Start: 2024-11-19

## 2024-12-04 ENCOUNTER — TELEPHONE (OUTPATIENT)
Age: 64
End: 2024-12-04

## 2024-12-04 DIAGNOSIS — Z78.0 ENCOUNTER FOR OSTEOPOROSIS SCREENING IN ASYMPTOMATIC POSTMENOPAUSAL PATIENT: Primary | ICD-10-CM

## 2024-12-04 DIAGNOSIS — Z13.820 ENCOUNTER FOR OSTEOPOROSIS SCREENING IN ASYMPTOMATIC POSTMENOPAUSAL PATIENT: Primary | ICD-10-CM

## 2024-12-04 NOTE — TELEPHONE ENCOUNTER
Patient called to confirm her upcoming appointment for an AWV on 12/10.  She would also like to schedule her Dexa Scan, however the order in the chart is .  Please advise if the order can be updated so patient can schedule.  Thank you!

## 2024-12-06 ENCOUNTER — TELEPHONE (OUTPATIENT)
Dept: ADMINISTRATIVE | Facility: OTHER | Age: 64
End: 2024-12-06

## 2024-12-06 NOTE — TELEPHONE ENCOUNTER
12/06/24 2:55 PM    Patient was flagged by a Third Party Payer report as being due for a refill on the following medications,   Atorvastatin calcium . Patient was contacted to review these medications. There was no answer and a message was left.     Thank you.  Chris Betancourt MA  [facility]

## 2024-12-09 ENCOUNTER — HOSPITAL ENCOUNTER (OUTPATIENT)
Facility: HOSPITAL | Age: 64
Discharge: HOME | End: 2024-12-09
Payer: COMMERCIAL

## 2024-12-09 ENCOUNTER — APPOINTMENT (OUTPATIENT)
Dept: LAB | Facility: HOSPITAL | Age: 64
End: 2024-12-09
Payer: COMMERCIAL

## 2024-12-09 ENCOUNTER — RESULTS FOLLOW-UP (OUTPATIENT)
Dept: FAMILY MEDICINE CLINIC | Facility: CLINIC | Age: 64
End: 2024-12-09

## 2024-12-09 VITALS — HEIGHT: 65 IN | BODY MASS INDEX: 36.49 KG/M2 | WEIGHT: 219 LBS

## 2024-12-09 DIAGNOSIS — Z13.820 ENCOUNTER FOR OSTEOPOROSIS SCREENING IN ASYMPTOMATIC POSTMENOPAUSAL PATIENT: ICD-10-CM

## 2024-12-09 DIAGNOSIS — E78.2 MIXED HYPERLIPIDEMIA: ICD-10-CM

## 2024-12-09 DIAGNOSIS — Z78.0 ENCOUNTER FOR OSTEOPOROSIS SCREENING IN ASYMPTOMATIC POSTMENOPAUSAL PATIENT: ICD-10-CM

## 2024-12-09 LAB
ALBUMIN SERPL BCG-MCNC: 4.1 G/DL (ref 3.5–5)
ALP SERPL-CCNC: 60 U/L (ref 34–104)
ALT SERPL W P-5'-P-CCNC: 37 U/L (ref 7–52)
ANION GAP SERPL CALCULATED.3IONS-SCNC: 12 MMOL/L (ref 4–13)
AST SERPL W P-5'-P-CCNC: 21 U/L (ref 13–39)
BILIRUB SERPL-MCNC: 0.54 MG/DL (ref 0.2–1)
BUN SERPL-MCNC: 12 MG/DL (ref 5–25)
CALCIUM SERPL-MCNC: 9.8 MG/DL (ref 8.4–10.2)
CHLORIDE SERPL-SCNC: 103 MMOL/L (ref 96–108)
CHOLEST SERPL-MCNC: 150 MG/DL (ref ?–200)
CO2 SERPL-SCNC: 28 MMOL/L (ref 21–32)
CREAT SERPL-MCNC: 0.8 MG/DL (ref 0.6–1.3)
ERYTHROCYTE [DISTWIDTH] IN BLOOD BY AUTOMATED COUNT: 12.8 % (ref 11.6–15.1)
GFR SERPL CREATININE-BSD FRML MDRD: 77 ML/MIN/1.73SQ M
GLUCOSE P FAST SERPL-MCNC: 107 MG/DL (ref 65–99)
HCT VFR BLD AUTO: 44.5 % (ref 34.8–46.1)
HDLC SERPL-MCNC: 45 MG/DL
HGB BLD-MCNC: 14.3 G/DL (ref 11.5–15.4)
LDLC SERPL CALC-MCNC: 85 MG/DL (ref 0–100)
MCH RBC QN AUTO: 30.2 PG (ref 26.8–34.3)
MCHC RBC AUTO-ENTMCNC: 32.1 G/DL (ref 31.4–37.4)
MCV RBC AUTO: 94 FL (ref 82–98)
PLATELET # BLD AUTO: 252 THOUSANDS/UL (ref 149–390)
PMV BLD AUTO: 10.8 FL (ref 8.9–12.7)
POTASSIUM SERPL-SCNC: 3.8 MMOL/L (ref 3.5–5.3)
PROT SERPL-MCNC: 7.6 G/DL (ref 6.4–8.4)
RBC # BLD AUTO: 4.74 MILLION/UL (ref 3.81–5.12)
SODIUM SERPL-SCNC: 143 MMOL/L (ref 135–147)
TRIGL SERPL-MCNC: 101 MG/DL (ref ?–150)
TSH SERPL DL<=0.05 MIU/L-ACNC: 3.65 UIU/ML (ref 0.45–4.5)
WBC # BLD AUTO: 5.78 THOUSAND/UL (ref 4.31–10.16)

## 2024-12-09 PROCEDURE — 80061 LIPID PANEL: CPT

## 2024-12-09 PROCEDURE — 77080 DXA BONE DENSITY AXIAL: CPT

## 2024-12-09 PROCEDURE — 85027 COMPLETE CBC AUTOMATED: CPT

## 2024-12-09 PROCEDURE — 84443 ASSAY THYROID STIM HORMONE: CPT

## 2024-12-09 PROCEDURE — 80053 COMPREHEN METABOLIC PANEL: CPT

## 2024-12-09 PROCEDURE — 36415 COLL VENOUS BLD VENIPUNCTURE: CPT

## 2024-12-09 NOTE — TELEPHONE ENCOUNTER
12/09/24 3:59 PM    Patient was flagged by a Third Party Payer report as being due for a refill on the following medications, Atorvastatin . Patient was contacted to review these medications. Inbox full/ phone number disconnected; a message could not be left for the patient.     Thank you.  Chris Betancourt MA  [facility]

## 2024-12-10 ENCOUNTER — OFFICE VISIT (OUTPATIENT)
Dept: FAMILY MEDICINE CLINIC | Facility: CLINIC | Age: 64
End: 2024-12-10
Payer: COMMERCIAL

## 2024-12-10 VITALS
HEART RATE: 83 BPM | OXYGEN SATURATION: 97 % | SYSTOLIC BLOOD PRESSURE: 112 MMHG | TEMPERATURE: 97.5 F | RESPIRATION RATE: 18 BRPM | WEIGHT: 222.6 LBS | DIASTOLIC BLOOD PRESSURE: 74 MMHG | HEIGHT: 64 IN | BODY MASS INDEX: 38 KG/M2

## 2024-12-10 DIAGNOSIS — E78.2 MIXED HYPERLIPIDEMIA: ICD-10-CM

## 2024-12-10 DIAGNOSIS — E66.812 CLASS 2 SEVERE OBESITY DUE TO EXCESS CALORIES WITH SERIOUS COMORBIDITY AND BODY MASS INDEX (BMI) OF 36.0 TO 36.9 IN ADULT (HCC): ICD-10-CM

## 2024-12-10 DIAGNOSIS — Z00.00 MEDICARE ANNUAL WELLNESS VISIT, SUBSEQUENT: Primary | ICD-10-CM

## 2024-12-10 DIAGNOSIS — F33.42 RECURRENT MAJOR DEPRESSIVE DISORDER, IN FULL REMISSION (HCC): ICD-10-CM

## 2024-12-10 DIAGNOSIS — M81.0 AGE-RELATED OSTEOPOROSIS WITHOUT CURRENT PATHOLOGICAL FRACTURE: ICD-10-CM

## 2024-12-10 DIAGNOSIS — E66.01 CLASS 2 SEVERE OBESITY DUE TO EXCESS CALORIES WITH SERIOUS COMORBIDITY AND BODY MASS INDEX (BMI) OF 36.0 TO 36.9 IN ADULT (HCC): ICD-10-CM

## 2024-12-10 DIAGNOSIS — Z23 ENCOUNTER FOR IMMUNIZATION: ICD-10-CM

## 2024-12-10 PROCEDURE — 99214 OFFICE O/P EST MOD 30 MIN: CPT | Performed by: FAMILY MEDICINE

## 2024-12-10 PROCEDURE — 90662 IIV NO PRSV INCREASED AG IM: CPT

## 2024-12-10 PROCEDURE — 90471 IMMUNIZATION ADMIN: CPT

## 2024-12-10 PROCEDURE — G0439 PPPS, SUBSEQ VISIT: HCPCS | Performed by: FAMILY MEDICINE

## 2024-12-10 RX ORDER — ATORVASTATIN CALCIUM 10 MG/1
10 TABLET, FILM COATED ORAL DAILY
Qty: 100 TABLET | Refills: 1 | Status: SHIPPED | OUTPATIENT
Start: 2024-12-10

## 2024-12-10 NOTE — ASSESSMENT & PLAN NOTE
Discuss osteoporosis  To review fosamax and write back to me  Calcium and vitamin d  600mg calcium twice daily  Vitamin [id number] winter, 2000 daily  Weight bearing exercise

## 2024-12-10 NOTE — PATIENT INSTRUCTIONS
Medicare Preventive Visit Patient Instructions  Thank you for completing your Welcome to Medicare Visit or Medicare Annual Wellness Visit today. Your next wellness visit will be due in one year (12/11/2025).  The screening/preventive services that you may require over the next 5-10 years are detailed below. Some tests may not apply to you based off risk factors and/or age. Screening tests ordered at today's visit but not completed yet may show as past due. Also, please note that scanned in results may not display below.  Preventive Screenings:  Service Recommendations Previous Testing/Comments   Colorectal Cancer Screening  * Colonoscopy    * Fecal Occult Blood Test (FOBT)/Fecal Immunochemical Test (FIT)  * Fecal DNA/Cologuard Test  * Flexible Sigmoidoscopy Age: 45-75 years old   Colonoscopy: every 10 years (may be performed more frequently if at higher risk)  OR  FOBT/FIT: every 1 year  OR  Cologuard: every 3 years  OR  Sigmoidoscopy: every 5 years  Screening may be recommended earlier than age 45 if at higher risk for colorectal cancer. Also, an individualized decision between you and your healthcare provider will decide whether screening between the ages of 76-85 would be appropriate. Colonoscopy: Not on file  FOBT/FIT: Not on file  Cologuard: 10/11/2022  Sigmoidoscopy: Not on file    Screening Current     Breast Cancer Screening Age: 40+ years old  Frequency: every 1-2 years  Not required if history of left and right mastectomy Mammogram: 11/14/2024    Screening Current   Cervical Cancer Screening Between the ages of 21-29, pap smear recommended once every 3 years.   Between the ages of 30-65, can perform pap smear with HPV co-testing every 5 years.   Recommendations may differ for women with a history of total hysterectomy, cervical cancer, or abnormal pap smears in past. Pap Smear: 01/19/2021    Screening Not Indicated   Hepatitis C Screening Once for adults born between 1945 and 1965  More frequently in  patients at high risk for Hepatitis C Hep C Antibody: 11/13/2019    Screening Current   Diabetes Screening 1-2 times per year if you're at risk for diabetes or have pre-diabetes Fasting glucose: 107 mg/dL (12/9/2024)  A1C: 5.4 % (3/19/2021)  Screening Current   Cholesterol Screening Once every 5 years if you don't have a lipid disorder. May order more often based on risk factors. Lipid panel: 12/09/2024    Screening Not Indicated  History Lipid Disorder     Other Preventive Screenings Covered by Medicare:  Abdominal Aortic Aneurysm (AAA) Screening: covered once if your at risk. You're considered to be at risk if you have a family history of AAA.  Lung Cancer Screening: covers low dose CT scan once per year if you meet all of the following conditions: (1) Age 55-77; (2) No signs or symptoms of lung cancer; (3) Current smoker or have quit smoking within the last 15 years; (4) You have a tobacco smoking history of at least 20 pack years (packs per day multiplied by number of years you smoked); (5) You get a written order from a healthcare provider.  Glaucoma Screening: covered annually if you're considered high risk: (1) You have diabetes OR (2) Family history of glaucoma OR (3)  aged 50 and older OR (4)  American aged 65 and older  Osteoporosis Screening: covered every 2 years if you meet one of the following conditions: (1) You're estrogen deficient and at risk for osteoporosis based off medical history and other findings; (2) Have a vertebral abnormality; (3) On glucocorticoid therapy for more than 3 months; (4) Have primary hyperparathyroidism; (5) On osteoporosis medications and need to assess response to drug therapy.   Last bone density test (DXA Scan): 12/09/2024.  HIV Screening: covered annually if you're between the age of 15-65. Also covered annually if you are younger than 15 and older than 65 with risk factors for HIV infection. For pregnant patients, it is covered up to 3 times per  pregnancy.    Immunizations:  Immunization Recommendations   Influenza Vaccine Annual influenza vaccination during flu season is recommended for all persons aged >= 6 months who do not have contraindications   Pneumococcal Vaccine   * Pneumococcal conjugate vaccine = PCV13 (Prevnar 13), PCV15 (Vaxneuvance), PCV20 (Prevnar 20)  * Pneumococcal polysaccharide vaccine = PPSV23 (Pneumovax) Adults 19-65 yo with certain risk factors or if 65+ yo  If never received any pneumonia vaccine: recommend Prevnar 20 (PCV20)  Give PCV20 if previously received 1 dose of PCV13 or PPSV23   Hepatitis B Vaccine 3 dose series if at intermediate or high risk (ex: diabetes, end stage renal disease, liver disease)   Respiratory syncytial virus (RSV) Vaccine - COVERED BY MEDICARE PART D  * RSVPreF3 (Arexvy) CDC recommends that adults 60 years of age and older may receive a single dose of RSV vaccine using shared clinical decision-making (SCDM)   Tetanus (Td) Vaccine - COST NOT COVERED BY MEDICARE PART B Following completion of primary series, a booster dose should be given every 10 years to maintain immunity against tetanus. Td may also be given as tetanus wound prophylaxis.   Tdap Vaccine - COST NOT COVERED BY MEDICARE PART B Recommended at least once for all adults. For pregnant patients, recommended with each pregnancy.   Shingles Vaccine (Shingrix) - COST NOT COVERED BY MEDICARE PART B  2 shot series recommended in those 19 years and older who have or will have weakened immune systems or those 50 years and older     Health Maintenance Due:      Topic Date Due   • Colorectal Cancer Screening  10/11/2025   • Breast Cancer Screening: Mammogram  11/14/2025   • Hepatitis C Screening  Completed   • Cervical Cancer Screening  Discontinued     Immunizations Due:      Topic Date Due   • Influenza Vaccine (1) 09/01/2024   • COVID-19 Vaccine (4 - 2024-25 season) 09/01/2024     Advance Directives   What are advance directives?  Advance directives are  legal documents that state your wishes and plans for medical care. These plans are made ahead of time in case you lose your ability to make decisions for yourself. Advance directives can apply to any medical decision, such as the treatments you want, and if you want to donate organs.   What are the types of advance directives?  There are many types of advance directives, and each state has rules about how to use them. You may choose a combination of any of the following:  Living will:  This is a written record of the treatment you want. You can also choose which treatments you do not want, which to limit, and which to stop at a certain time. This includes surgery, medicine, IV fluid, and tube feedings.   Durable power of  for healthcare (DPAHC):  This is a written record that states who you want to make healthcare choices for you when you are unable to make them for yourself. This person, called a proxy, is usually a family member or a friend. You may choose more than 1 proxy.  Do not resuscitate (DNR) order:  A DNR order is used in case your heart stops beating or you stop breathing. It is a request not to have certain forms of treatment, such as CPR. A DNR order may be included in other types of advance directives.  Medical directive:  This covers the care that you want if you are in a coma, near death, or unable to make decisions for yourself. You can list the treatments you want for each condition. Treatment may include pain medicine, surgery, blood transfusions, dialysis, IV or tube feedings, and a ventilator (breathing machine).  Values history:  This document has questions about your views, beliefs, and how you feel and think about life. This information can help others choose the care that you would choose.  Why are advance directives important?  An advance directive helps you control your care. Although spoken wishes may be used, it is better to have your wishes written down. Spoken wishes can be  misunderstood, or not followed. Treatments may be given even if you do not want them. An advance directive may make it easier for your family to make difficult choices about your care.   Weight Management   Why it is important to manage your weight:  Being overweight increases your risk of health conditions such as heart disease, high blood pressure, type 2 diabetes, and certain types of cancer. It can also increase your risk for osteoarthritis, sleep apnea, and other respiratory problems. Aim for a slow, steady weight loss. Even a small amount of weight loss can lower your risk of health problems.  How to lose weight safely:  A safe and healthy way to lose weight is to eat fewer calories and get regular exercise. You can lose up about 1 pound a week by decreasing the number of calories you eat by 500 calories each day.   Healthy meal plan for weight management:  A healthy meal plan includes a variety of foods, contains fewer calories, and helps you stay healthy. A healthy meal plan includes the following:  Eat whole-grain foods more often.  A healthy meal plan should contain fiber. Fiber is the part of grains, fruits, and vegetables that is not broken down by your body. Whole-grain foods are healthy and provide extra fiber in your diet. Some examples of whole-grain foods are whole-wheat breads and pastas, oatmeal, brown rice, and bulgur.  Eat a variety of vegetables every day.  Include dark, leafy greens such as spinach, kale, ofelia greens, and mustard greens. Eat yellow and orange vegetables such as carrots, sweet potatoes, and winter squash.   Eat a variety of fruits every day.  Choose fresh or canned fruit (canned in its own juice or light syrup) instead of juice. Fruit juice has very little or no fiber.  Eat low-fat dairy foods.  Drink fat-free (skim) milk or 1% milk. Eat fat-free yogurt and low-fat cottage cheese. Try low-fat cheeses such as mozzarella and other reduced-fat cheeses.  Choose meat and other  "protein foods that are low in fat.  Choose beans or other legumes such as split peas or lentils. Choose fish, skinless poultry (chicken or turkey), or lean cuts of red meat (beef or pork). Before you cook meat or poultry, cut off any visible fat.   Use less fat and oil.  Try baking foods instead of frying them. Add less fat, such as margarine, sour cream, regular salad dressing and mayonnaise to foods. Eat fewer high-fat foods. Some examples of high-fat foods include french fries, doughnuts, ice cream, and cakes.  Eat fewer sweets.  Limit foods and drinks that are high in sugar. This includes candy, cookies, regular soda, and sweetened drinks.  Exercise:  Exercise at least 30 minutes per day on most days of the week. Some examples of exercise include walking, biking, dancing, and swimming. You can also fit in more physical activity by taking the stairs instead of the elevator or parking farther away from stores. Ask your healthcare provider about the best exercise plan for you.   Alcohol Use and Your Health    Drinking too much can harm your health.  Excessive alcohol use leads to about 88,000 death in the [place] each year, and shortens the life of those who diet by almost 30 years.  Further, excessive drinking cost the economy $249 billion in 2010.  Most excessive drinkers are not alcohol dependent.    Excessive alcohol use has immediate effects that increase the risk of many harmful health conditions.  These are most often the result of binge drinking.  Over time, excessive alcohol use can lead to the development of chronic diseases and other series health problems.    What is considered a \"drink\"?        Excessive alcohol use includes:  Binge Drinking: For women, 4 or more drinks consumed on one occasion. For men, 5 or more drinks consumed on one occasion.  Heavy Drinking: For women, 8 or more drinks per week. For men, 15 or more drinks per week  Any alcohol used by pregnant women  Any alcohol used by " those under the age of 21 years    If you choose to drink, do so in moderation:  Do not drink at all if you are under the age of 21, or if you are or may be pregnant, or have health problems that could be made worse by drinking.  For women, up to 1 drink per day  For men, up to 2 drinks a day    No one should begin drinking or drink more frequently based on potential health benefits    Short-Term Health Risks:  Injuries: motor vehicle crashes, falls, drownings, burns  Violence: homicide, suicide, sexual assault, intimate partner violence  Alcohol poisoning  Reproductive health: risky sexual behaviors, unintended prengnacy, sexually transmitted diseases, miscarriage, stillbirth, fetal alcohol syndrome    Long-Term Health Risks:  Chronic diseases: high blood pressure, heart disease, stroke, liver disease, digestive problems  Cancers: breast, mouth and throat, liver, colon  Learning and memory problems: dementia, poor school performance  Mental health: depression, anxiety, insomnia  Social problems: lost productivity, family problems, unemployment  Alcohol dependence    For support and more information:  Substance Abuse and Mental Health Services Administration  [address]  [place] [address]  Web Address: http://www.samhsa.gov    Alcoholics Anonymous        Web Address: http://www.aa.org    https://www.cdc.gov/alcohol/fact-sheets/alcohol-use.htm     © Copyright [employer] 2018 Information is for End User's use only and may not be sold, redistributed or otherwise used for commercial purposes. All illustrations and images included in CareNotes® are the copyrighted property of A.D.A.M., Inc. or [employer]

## 2024-12-10 NOTE — PROGRESS NOTES
Name: Kaur Castro      : 1960      MRN: [id number]  Encounter Provider: Kim Del Castillo DO  Encounter Date: 12/10/2024   Encounter department: [facility]    Assessment & Plan  Medicare annual wellness visit, subsequent  Flu shot today       Encounter for immunization    Orders:  •  influenza vaccine, high-dose, PF 0.5 mL (Fluzone High Dose)    Recurrent major depressive disorder, in full remission (HCC)  Increase zoloft to 150mg         Class 2 severe obesity due to excess calories with serious comorbidity and body mass index (BMI) of 36.0 to 36.9 in adult (HCC)  Will start exercising       Age-related osteoporosis without current pathological fracture  Discuss osteoporosis  To review fosamax and write back to me  Calcium and vitamin d  600mg calcium twice daily  Vitamin [id number] winter, 2000 daily  Weight bearing exercise       Mixed hyperlipidemia    Orders:  •  atorvastatin (LIPITOR) 10 mg tablet; Take 1 tablet (10 mg total) by mouth daily      Depression Screening and Follow-up Plan: Patient was screened for depression during today's encounter. They screened negative with a PHQ-9 score of 3.      Preventive health issues were discussed with patient, and age appropriate screening tests were ordered as noted in patient's After Visit Summary. Personalized health advice and appropriate referrals for health education or preventive services given if needed, as noted in patient's After Visit Summary.    History of Present Illness     Here for wellness  Will be taking her sister for chemo treatments  Feeling more irritable  Is the person in the family who helps everyone  Living with Ramesh-he is getting on her nerves  Labs reviewed  Cruise in march -  [facility]       Patient Care Team:  Kim Del Castillo DO as PCP - General  Kim Del Castillo DO as PCP - PCP-[facility]  DO She Villanueva MD Robert Kitei, MD Sagar V. Mehta, MD    Review of  Systems   Constitutional: Negative.    HENT: Negative.     Eyes: Negative.    Respiratory: Negative.     Cardiovascular: Negative.    Gastrointestinal: Negative.    Endocrine: Negative.    Genitourinary: Negative.    Musculoskeletal: Negative.    Allergic/Immunologic: Negative.    Neurological: Negative.    Hematological: Negative.    Psychiatric/Behavioral:  Positive for dysphoric mood. The patient is nervous/anxious.      Medical History Reviewed by provider this encounter:  Tobacco  Allergies  Meds  Problems  Med Hx  Surg Hx  Fam Hx       Annual Wellness Visit Questionnaire   Kaur is here for her Subsequent Wellness visit.     Health Risk Assessment:   Patient rates overall health as good. Patient feels that their physical health rating is same. Patient is satisfied with their life. Eyesight was rated as same. Hearing was rated as same. Patient feels that their emotional and mental health rating is slightly better. Patients states they are sometimes angry. Patient states they are sometimes unusually tired/fatigued. Pain experienced in the last 7 days has been some. Patient's pain rating has been 5/10. Patient states that she has experienced no weight loss or gain in last 6 months. Had a stomach bug after Thanksgiving    Depression Screening:   PHQ-9 Score: 3      Fall Risk Screening:   In the past year, patient has experienced: no history of falling in past year      Urinary Incontinence Screening:   Patient has not leaked urine accidently in the last six months.     Home Safety:  Patient does not have trouble with stairs inside or outside of their home. Patient has working smoke alarms and has working carbon monoxide detector. Home safety hazards include: none.     Nutrition:   Current diet is Limited junk food. Trying to drink less alcohol    Medications:   Patient is currently taking over-the-counter supplements. OTC medications include: see medication list. Patient is able to manage medications.      Activities of Daily Living (ADLs)/Instrumental Activities of Daily Living (IADLs):   Walk and transfer into and out of bed and chair?: Yes  Dress and groom yourself?: Yes    Bathe or shower yourself?: Yes    Feed yourself? Yes  Do your laundry/housekeeping?: Yes  Manage your money, pay your bills and track your expenses?: Yes  Make your own meals?: Yes    Do your own shopping?: Yes    Previous Hospitalizations:   Any hospitalizations or ED visits within the last 12 months?: No      Advance Care Planning:   Living will: No    Durable POA for healthcare: No    Advanced directive: No      PREVENTIVE SCREENINGS      Cardiovascular Screening:    General: Screening Not Indicated and History Lipid Disorder      Diabetes Screening:     General: Screening Current      Colorectal Cancer Screening:     General: Screening Current      Breast Cancer Screening:     General: Screening Current      Cervical Cancer Screening:    General: Screening Not Indicated      Lung Cancer Screening:     General: Screening Not Indicated      Hepatitis C Screening:    General: Screening Current    Screening, Brief Intervention, and Referral to Treatment (SBIRT)    Screening  Typical number of drinks in a day: 2  Typical number of drinks in a week: 6  Interpretation: Low risk drinking behavior.    AUDIT-C Screenin) How often did you have a drink containing alcohol in the past year? 2 to 3 times a week  2) How many drinks did you have on a typical day when you were drinking in the past year? 3 to 4  3) How often did you have 6 or more drinks on one occasion in the past year? less than monthly    AUDIT-C Score: 4  Interpretation: Score 3-12 (female): POSITIVE screen for alcohol misuse    AUDIT Screenin) How often during the last year have you found that you were not able to stop drinking once you had started? 0 - never  5) How often during the last year have you failed to do what was normally expected from you because of drinking? 0 -  never  6) How often during the last year have you needed a first drink in the morning to get yourself going after a heavy drinking session? 0 - never  7) How often during the last year have you had a feeling of guilt or remorse after drinking? 1 - less than monthly  8) How often during the last year have you been unable to remember what happened the night before because you had been drinking? 1 - less than monthly  9) Have you or someone else been injured as a result of your drinking? 0 - no  10) Has a relative or friend or a doctor or another health worker been concerned about your drinking or suggested you cut down? 0 - no    AUDIT Score: 6  Interpretation: Low risk alcohol consumption    Single Item Drug Screening:  How often have you used an illegal drug (including marijuana) or a prescription medication for non-medical reasons in the past year? never    Single Item Drug Screen Score: 0  Interpretation: Negative screen for possible drug use disorder    Social Drivers of Health     Financial Resource Strain: Low Risk  (7/18/2023)    Overall Financial Resource Strain (CARDIA)    • Difficulty of Paying Living Expenses: Not very hard   Food Insecurity: No Food Insecurity (12/3/2024)    Hunger Vital Sign    • Worried About Running Out of Food in the Last Year: Never true    • Ran Out of Food in the Last Year: Never true   Transportation Needs: No Transportation Needs (12/3/2024)    PRAPARE - Transportation    • Lack of Transportation (Medical): No    • Lack of Transportation (Non-Medical): No   Housing Stability: Low Risk  (12/3/2024)    Housing Stability Vital Sign    • Unable to Pay for Housing in the Last Year: No    • Number of Times Moved in the Last Year: 0    • Homeless in the Last Year: No   Utilities: Not At Risk (12/3/2024)    [facility] Utilities    • Threatened with loss of utilities: No     No results found.    Objective   /74 (BP Location: Left arm, Patient Position: Sitting, Cuff Size: Large)   Pulse  "83   Temp 97.5 °F (36.4 °C) (Tympanic)   Resp 18   Ht 5' 4.41\" (1.636 m)   Wt 101 kg (222 lb 9.6 oz)   SpO2 97%   BMI 37.73 kg/m²     Physical Exam  Vitals and nursing note reviewed.   Constitutional:       Appearance: Normal appearance. She is well-developed.   HENT:      Head: Normocephalic and atraumatic.      Right Ear: External ear normal.      Left Ear: External ear normal.      Nose: Nose normal.   Eyes:      Extraocular Movements: Extraocular movements intact.      Conjunctiva/sclera: Conjunctivae normal.      Pupils: Pupils are equal, round, and reactive to light.   Cardiovascular:      Rate and Rhythm: Normal rate and regular rhythm.      Heart sounds: Normal heart sounds.   Pulmonary:      Effort: Pulmonary effort is normal.      Breath sounds: Normal breath sounds.   Abdominal:      General: Bowel sounds are normal.      Palpations: Abdomen is soft.   Musculoskeletal:         General: Normal range of motion.      Cervical back: Normal range of motion and neck supple.   Skin:     General: Skin is warm and dry.      Capillary Refill: Capillary refill takes less than 2 seconds.   Neurological:      Mental Status: She is alert and oriented to person, place, and time.   Psychiatric:         Behavior: Behavior normal.         Thought Content: Thought content normal.         Judgment: Judgment normal.         "

## 2025-01-09 PROBLEM — Z00.00 MEDICARE ANNUAL WELLNESS VISIT, SUBSEQUENT: Status: RESOLVED | Noted: 2023-07-18 | Resolved: 2025-01-09

## 2025-01-13 ENCOUNTER — HOSPITAL ENCOUNTER (OUTPATIENT)
Dept: MAMMOGRAPHY | Facility: CLINIC | Age: 65
Discharge: HOME | End: 2025-01-13
Payer: COMMERCIAL

## 2025-01-13 ENCOUNTER — HOSPITAL ENCOUNTER (OUTPATIENT)
Dept: ULTRASOUND IMAGING | Facility: CLINIC | Age: 65
Discharge: HOME | End: 2025-01-13
Payer: COMMERCIAL

## 2025-01-13 VITALS — HEIGHT: 64 IN | WEIGHT: 222 LBS | BODY MASS INDEX: 37.9 KG/M2

## 2025-01-13 DIAGNOSIS — R92.8 ABNORMAL SCREENING MAMMOGRAM: ICD-10-CM

## 2025-01-13 PROCEDURE — 77065 DX MAMMO INCL CAD UNI: CPT

## 2025-01-13 PROCEDURE — G0279 TOMOSYNTHESIS, MAMMO: HCPCS

## 2025-02-15 DIAGNOSIS — F32.A DEPRESSION, UNSPECIFIED DEPRESSION TYPE: ICD-10-CM

## 2025-02-15 RX ORDER — SERTRALINE HYDROCHLORIDE 100 MG/1
TABLET, FILM COATED ORAL
Qty: 135 TABLET | Refills: 1 | Status: SHIPPED | OUTPATIENT
Start: 2025-02-15

## 2025-03-14 ENCOUNTER — TELEPHONE (OUTPATIENT)
Age: 65
End: 2025-03-14

## 2025-03-14 NOTE — TELEPHONE ENCOUNTER
Pt called in stating she needs something prescribed for motion sickness while she's on a cruise.    She also mentioned that Dr. Del Castillo discussed Fosamax with her but she hasn't heard anything else about it.    She'll be using [facility] on [address]

## 2025-03-14 NOTE — TELEPHONE ENCOUNTER
Does she want pills or patches?. She was supposed to look into starting fosmax, does she want to start?

## 2025-03-17 DIAGNOSIS — M81.0 AGE-RELATED OSTEOPOROSIS WITHOUT CURRENT PATHOLOGICAL FRACTURE: Primary | ICD-10-CM

## 2025-03-17 RX ORDER — ALENDRONATE SODIUM 70 MG/1
70 TABLET ORAL
Qty: 12 TABLET | Refills: 3 | Status: SHIPPED | OUTPATIENT
Start: 2025-03-17

## 2025-03-17 NOTE — TELEPHONE ENCOUNTER
Patient said she would like the motion sickness medication in pill form.    She is also ready to begin the Fosamax.

## 2025-03-18 DIAGNOSIS — E78.2 MIXED HYPERLIPIDEMIA: ICD-10-CM

## 2025-03-19 RX ORDER — ATORVASTATIN CALCIUM 10 MG/1
10 TABLET, FILM COATED ORAL DAILY
Qty: 100 TABLET | Refills: 1 | Status: SHIPPED | OUTPATIENT
Start: 2025-03-19

## 2025-06-06 ENCOUNTER — RA CDI HCC (OUTPATIENT)
Dept: OTHER | Facility: HOSPITAL | Age: 65
End: 2025-06-06

## 2025-06-12 ENCOUNTER — OFFICE VISIT (OUTPATIENT)
Dept: FAMILY MEDICINE CLINIC | Facility: CLINIC | Age: 65
End: 2025-06-12
Payer: COMMERCIAL

## 2025-06-12 VITALS
WEIGHT: 226 LBS | BODY MASS INDEX: 38.58 KG/M2 | HEIGHT: 64 IN | HEART RATE: 76 BPM | TEMPERATURE: 98.1 F | OXYGEN SATURATION: 97 % | RESPIRATION RATE: 16 BRPM | SYSTOLIC BLOOD PRESSURE: 122 MMHG | DIASTOLIC BLOOD PRESSURE: 84 MMHG

## 2025-06-12 DIAGNOSIS — M81.0 AGE-RELATED OSTEOPOROSIS WITHOUT CURRENT PATHOLOGICAL FRACTURE: ICD-10-CM

## 2025-06-12 DIAGNOSIS — E55.9 VITAMIN D DEFICIENCY: ICD-10-CM

## 2025-06-12 DIAGNOSIS — E66.01 CLASS 2 SEVERE OBESITY DUE TO EXCESS CALORIES WITH SERIOUS COMORBIDITY AND BODY MASS INDEX (BMI) OF 36.0 TO 36.9 IN ADULT (HCC): ICD-10-CM

## 2025-06-12 DIAGNOSIS — E78.2 MIXED HYPERLIPIDEMIA: Primary | ICD-10-CM

## 2025-06-12 DIAGNOSIS — E66.812 CLASS 2 SEVERE OBESITY DUE TO EXCESS CALORIES WITH SERIOUS COMORBIDITY AND BODY MASS INDEX (BMI) OF 36.0 TO 36.9 IN ADULT (HCC): ICD-10-CM

## 2025-06-12 DIAGNOSIS — F33.42 RECURRENT MAJOR DEPRESSIVE DISORDER, IN FULL REMISSION (HCC): ICD-10-CM

## 2025-06-12 PROCEDURE — 99214 OFFICE O/P EST MOD 30 MIN: CPT | Performed by: FAMILY MEDICINE

## 2025-06-12 PROCEDURE — G2211 COMPLEX E/M VISIT ADD ON: HCPCS | Performed by: FAMILY MEDICINE

## 2025-06-12 RX ORDER — ALENDRONATE SODIUM 70 MG/1
70 TABLET ORAL
Qty: 12 TABLET | Refills: 3 | Status: SHIPPED | OUTPATIENT
Start: 2025-06-12

## 2025-06-12 NOTE — PROGRESS NOTES
"Name: Kaur Castro      : 1960      MRN: [id number]  Encounter Provider: Kim Del Castillo DO  Encounter Date: 2025   Encounter department: [facility]    :  Assessment & Plan  Mixed hyperlipidemia    Orders:  •  CBC; Future  •  Comprehensive metabolic panel; Future  •  Lipid Panel with Direct LDL reflex; Future  •  TSH, 3rd generation with Free T4 reflex; Future    Age-related osteoporosis without current pathological fracture  Repeat dexa in   Orders:  •  Vitamin D 25 hydroxy; Future  •  alendronate (Fosamax) 70 mg tablet; Take 1 tablet (70 mg total) by mouth every 7 days    Recurrent major depressive disorder, in full remission (HCC)  Stable on zoloft         Vitamin D deficiency    Orders:  •  Vitamin D 25 hydroxy; Future    Class 2 severe obesity due to excess calories with serious comorbidity and body mass index (BMI) of 36.0 to 36.9 in adult (HCC)             Assessment & Plan          Depression Screening and Follow-up Plan: Patient was screened for depression during today's encounter. They screened negative with a PHQ-9 score of 0.          History of Present Illness     History of Present Illness  Here for follow up  Concerned about son, living conditions       Review of Systems   Constitutional: Negative.    HENT: Negative.     Eyes: Negative.    Respiratory: Negative.     Cardiovascular: Negative.    Gastrointestinal: Negative.    Endocrine: Negative.    Genitourinary: Negative.    Musculoskeletal: Negative.    Allergic/Immunologic: Negative.    Neurological: Negative.    Hematological: Negative.    Psychiatric/Behavioral:  Positive for dysphoric mood.      Objective   /84 (BP Location: Left arm, Patient Position: Sitting, Cuff Size: Large)   Pulse 76   Temp 98.1 °F (36.7 °C) (Tympanic)   Resp 16   Ht 5' 4\" (1.626 m)   Wt 103 kg (226 lb)   SpO2 97%   BMI 38.79 kg/m²     Physical Exam    Physical Exam  Vitals and nursing note reviewed.   Constitutional:  "      Appearance: She is well-developed.   HENT:      Head: Normocephalic and atraumatic.      Right Ear: External ear normal.      Left Ear: External ear normal.      Nose: Nose normal.     Eyes:      Conjunctiva/sclera: Conjunctivae normal.      Pupils: Pupils are equal, round, and reactive to light.       Cardiovascular:      Rate and Rhythm: Normal rate and regular rhythm.      Heart sounds: Normal heart sounds.   Pulmonary:      Effort: Pulmonary effort is normal.      Breath sounds: Normal breath sounds.   Abdominal:      General: Bowel sounds are normal.      Palpations: Abdomen is soft.     Musculoskeletal:         General: Normal range of motion.      Cervical back: Normal range of motion and neck supple.     Skin:     General: Skin is warm and dry.      Capillary Refill: Capillary refill takes less than 2 seconds.     Neurological:      Mental Status: She is alert and oriented to person, place, and time.     Psychiatric:         Behavior: Behavior normal.         Thought Content: Thought content normal.         Judgment: Judgment normal.

## 2025-06-12 NOTE — ASSESSMENT & PLAN NOTE
Repeat dexa in 2026  Orders:  •  Vitamin D 25 hydroxy; Future  •  alendronate (Fosamax) 70 mg tablet; Take 1 tablet (70 mg total) by mouth every 7 days   68